# Patient Record
Sex: FEMALE | Race: WHITE
[De-identification: names, ages, dates, MRNs, and addresses within clinical notes are randomized per-mention and may not be internally consistent; named-entity substitution may affect disease eponyms.]

---

## 2017-04-22 ENCOUNTER — HOSPITAL ENCOUNTER (EMERGENCY)
Dept: HOSPITAL 47 - EC | Age: 42
Discharge: HOME | End: 2017-04-22
Payer: COMMERCIAL

## 2017-04-22 VITALS — DIASTOLIC BLOOD PRESSURE: 72 MMHG | SYSTOLIC BLOOD PRESSURE: 151 MMHG | HEART RATE: 86 BPM | TEMPERATURE: 97.7 F

## 2017-04-22 VITALS — RESPIRATION RATE: 18 BRPM

## 2017-04-22 DIAGNOSIS — F17.200: ICD-10-CM

## 2017-04-22 DIAGNOSIS — Z98.890: ICD-10-CM

## 2017-04-22 DIAGNOSIS — F32.9: ICD-10-CM

## 2017-04-22 DIAGNOSIS — M25.572: ICD-10-CM

## 2017-04-22 DIAGNOSIS — G89.29: ICD-10-CM

## 2017-04-22 DIAGNOSIS — Z79.899: ICD-10-CM

## 2017-04-22 DIAGNOSIS — F41.9: Primary | ICD-10-CM

## 2017-04-22 DIAGNOSIS — F19.19: ICD-10-CM

## 2017-04-22 LAB
APAP SPEC-MCNC: <10 UG/ML
SALICYLATES SERPL-MCNC: <1 MG/DL

## 2017-04-22 PROCEDURE — 80306 DRUG TEST PRSMV INSTRMNT: CPT

## 2017-04-22 PROCEDURE — 36415 COLL VENOUS BLD VENIPUNCTURE: CPT

## 2017-04-22 PROCEDURE — 99284 EMERGENCY DEPT VISIT MOD MDM: CPT

## 2017-04-22 PROCEDURE — 83520 IMMUNOASSAY QUANT NOS NONAB: CPT
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
## 2017-05-01 ENCOUNTER — HOSPITAL ENCOUNTER (EMERGENCY)
Dept: HOSPITAL 47 - EC | Age: 42
Discharge: HOME | End: 2017-05-01
Payer: COMMERCIAL

## 2017-05-01 VITALS
TEMPERATURE: 98 F | RESPIRATION RATE: 20 BRPM | DIASTOLIC BLOOD PRESSURE: 90 MMHG | SYSTOLIC BLOOD PRESSURE: 140 MMHG | HEART RATE: 120 BPM

## 2017-05-01 DIAGNOSIS — S02.5XXA: Primary | ICD-10-CM

## 2017-05-01 DIAGNOSIS — F31.9: ICD-10-CM

## 2017-05-01 DIAGNOSIS — K04.7: ICD-10-CM

## 2017-05-01 DIAGNOSIS — Z79.899: ICD-10-CM
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
## 2017-05-01 DIAGNOSIS — F17.200: ICD-10-CM

## 2017-05-01 PROCEDURE — 99282 EMERGENCY DEPT VISIT SF MDM: CPT
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
## 2017-05-20 ENCOUNTER — HOSPITAL ENCOUNTER (EMERGENCY)
Dept: HOSPITAL 47 - EC | Age: 42
Discharge: HOME | End: 2017-05-20
Payer: COMMERCIAL

## 2017-05-20 VITALS — HEART RATE: 86 BPM | SYSTOLIC BLOOD PRESSURE: 113 MMHG | DIASTOLIC BLOOD PRESSURE: 63 MMHG | RESPIRATION RATE: 16 BRPM

## 2017-05-20 VITALS — TEMPERATURE: 98.2 F

## 2017-05-20 DIAGNOSIS — F43.10: ICD-10-CM

## 2017-05-20 DIAGNOSIS — F31.9: Primary | ICD-10-CM

## 2017-05-20 DIAGNOSIS — F17.200: ICD-10-CM

## 2017-05-20 PROCEDURE — 80306 DRUG TEST PRSMV INSTRMNT: CPT

## 2017-05-20 PROCEDURE — 82075 ASSAY OF BREATH ETHANOL: CPT

## 2017-05-20 PROCEDURE — 99284 EMERGENCY DEPT VISIT MOD MDM: CPT
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
## 2020-01-10 ENCOUNTER — HOSPITAL ENCOUNTER (EMERGENCY)
Dept: HOSPITAL 47 - EC | Age: 45
End: 2020-01-10
Payer: COMMERCIAL

## 2020-01-10 DIAGNOSIS — Z53.21: Primary | ICD-10-CM

## 2020-01-10 PROCEDURE — 99499 UNLISTED E&M SERVICE: CPT

## 2021-01-22 ENCOUNTER — HOSPITAL ENCOUNTER (EMERGENCY)
Dept: HOSPITAL 47 - EC | Age: 46
Discharge: OTHER INSTITUTION | End: 2021-01-22
Payer: COMMERCIAL

## 2021-01-22 VITALS
RESPIRATION RATE: 18 BRPM | TEMPERATURE: 98.6 F | SYSTOLIC BLOOD PRESSURE: 145 MMHG | HEART RATE: 121 BPM | DIASTOLIC BLOOD PRESSURE: 112 MMHG

## 2021-01-22 DIAGNOSIS — F17.200: ICD-10-CM

## 2021-01-22 DIAGNOSIS — R45.851: ICD-10-CM

## 2021-01-22 DIAGNOSIS — F32.9: Primary | ICD-10-CM

## 2021-01-22 PROCEDURE — 99285 EMERGENCY DEPT VISIT HI MDM: CPT

## 2021-01-22 PROCEDURE — 82075 ASSAY OF BREATH ETHANOL: CPT
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
## 2021-07-27 ENCOUNTER — HOSPITAL ENCOUNTER (EMERGENCY)
Dept: HOSPITAL 47 - EC | Age: 46
LOS: 1 days | Discharge: HOME | End: 2021-07-28
Payer: COMMERCIAL

## 2021-07-27 VITALS
TEMPERATURE: 98.3 F | HEART RATE: 129 BPM | SYSTOLIC BLOOD PRESSURE: 140 MMHG | DIASTOLIC BLOOD PRESSURE: 88 MMHG | RESPIRATION RATE: 22 BRPM
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
## 2021-07-27 DIAGNOSIS — F12.90: ICD-10-CM

## 2021-07-27 DIAGNOSIS — F17.200: ICD-10-CM

## 2021-07-27 DIAGNOSIS — F10.129: Primary | ICD-10-CM

## 2021-07-27 LAB
ALBUMIN SERPL-MCNC: 4.9 G/DL (ref 3.5–5)
ALP SERPL-CCNC: 77 U/L (ref 38–126)
ALT SERPL-CCNC: 11 U/L (ref 4–34)
ANION GAP SERPL CALC-SCNC: 15 MMOL/L
APAP SPEC-MCNC: <10 UG/ML
AST SERPL-CCNC: 19 U/L (ref 14–36)
BASOPHILS # BLD AUTO: 0.1 K/UL (ref 0–0.2)
BASOPHILS NFR BLD AUTO: 0 %
BUN SERPL-SCNC: 10 MG/DL (ref 7–17)
CALCIUM SPEC-MCNC: 9.9 MG/DL (ref 8.4–10.2)
CHLORIDE SERPL-SCNC: 109 MMOL/L (ref 98–107)
CO2 SERPL-SCNC: 23 MMOL/L (ref 22–30)
EOSINOPHIL # BLD AUTO: 0.1 K/UL (ref 0–0.7)
EOSINOPHIL NFR BLD AUTO: 1 %
ERYTHROCYTE [DISTWIDTH] IN BLOOD BY AUTOMATED COUNT: 5.01 M/UL (ref 3.8–5.4)
ERYTHROCYTE [DISTWIDTH] IN BLOOD: 14.5 % (ref 11.5–15.5)
GLUCOSE SERPL-MCNC: 120 MG/DL (ref 74–99)
HCT VFR BLD AUTO: 44.3 % (ref 34–46)
HGB BLD-MCNC: 14.4 GM/DL (ref 11.4–16)
LYMPHOCYTES # SPEC AUTO: 3.7 K/UL (ref 1–4.8)
LYMPHOCYTES NFR SPEC AUTO: 34 %
MCH RBC QN AUTO: 28.8 PG (ref 25–35)
MCHC RBC AUTO-ENTMCNC: 32.5 G/DL (ref 31–37)
MCV RBC AUTO: 88.5 FL (ref 80–100)
MONOCYTES # BLD AUTO: 0.5 K/UL (ref 0–1)
MONOCYTES NFR BLD AUTO: 5 %
NEUTROPHILS # BLD AUTO: 6.1 K/UL (ref 1.3–7.7)
NEUTROPHILS NFR BLD AUTO: 57 %
PH UR: 5.5 [PH] (ref 5–8)
PLATELET # BLD AUTO: 225 K/UL (ref 150–450)
POTASSIUM SERPL-SCNC: 3.5 MMOL/L (ref 3.5–5.1)
PROT SERPL-MCNC: 7.9 G/DL (ref 6.3–8.2)
RBC UR QL: 1 /HPF (ref 0–5)
SALICYLATES SERPL-MCNC: <1 MG/DL
SODIUM SERPL-SCNC: 147 MMOL/L (ref 137–145)
SP GR UR: 1.01 (ref 1–1.03)
SQUAMOUS UR QL AUTO: <1 /HPF (ref 0–4)
UROBILINOGEN UR QL STRIP: <2 MG/DL (ref ?–2)
WBC # BLD AUTO: 10.8 K/UL (ref 3.8–10.6)
WBC #/AREA URNS HPF: 1 /HPF (ref 0–5)

## 2021-07-27 PROCEDURE — 80053 COMPREHEN METABOLIC PANEL: CPT

## 2021-07-27 PROCEDURE — 80143 DRUG ASSAY ACETAMINOPHEN: CPT

## 2021-07-27 PROCEDURE — 80179 DRUG ASSAY SALICYLATE: CPT

## 2021-07-27 PROCEDURE — 85025 COMPLETE CBC W/AUTO DIFF WBC: CPT

## 2021-07-27 PROCEDURE — 80306 DRUG TEST PRSMV INSTRMNT: CPT

## 2021-07-27 PROCEDURE — 80320 DRUG SCREEN QUANTALCOHOLS: CPT

## 2021-07-27 PROCEDURE — 36415 COLL VENOUS BLD VENIPUNCTURE: CPT

## 2021-07-27 PROCEDURE — 99284 EMERGENCY DEPT VISIT MOD MDM: CPT

## 2021-07-27 PROCEDURE — 81001 URINALYSIS AUTO W/SCOPE: CPT
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
## 2022-06-03 ENCOUNTER — HOSPITAL ENCOUNTER (EMERGENCY)
Dept: HOSPITAL 47 - EC | Age: 47
Discharge: HOME | End: 2022-06-03
Payer: COMMERCIAL

## 2022-06-03 VITALS — SYSTOLIC BLOOD PRESSURE: 138 MMHG | HEART RATE: 76 BPM | RESPIRATION RATE: 16 BRPM | DIASTOLIC BLOOD PRESSURE: 81 MMHG

## 2022-06-03 VITALS — TEMPERATURE: 98.2 F

## 2022-06-03 DIAGNOSIS — F15.10: Primary | ICD-10-CM

## 2022-06-03 DIAGNOSIS — F17.200: ICD-10-CM

## 2022-06-03 PROCEDURE — 80320 DRUG SCREEN QUANTALCOHOLS: CPT

## 2022-06-03 PROCEDURE — 99284 EMERGENCY DEPT VISIT MOD MDM: CPT

## 2022-06-03 PROCEDURE — 36415 COLL VENOUS BLD VENIPUNCTURE: CPT

## 2022-06-03 NOTE — ED
General Adult HPI





- General


Stated complaint: Altered Mental Status


Time Seen by Provider: 06/03/22 13:17


Source: police, RN notes reviewed, old records reviewed


Limitations: altered mental status





- History of Present Illness


Initial comments: 





This is a 46-year-old female presents emergency Department in the custody of the

police.  Patient was found wandering in the street not making any sense just bab

rohan and very paranoid.   states that they have dull with her 

before and she is a methamphetamine user.  Patient is unable to give any history

at this time because she is so delusional and fearful of us she is not 

responding to any questions and does not look as she understands the questions. 

There is no history of any trauma that we know of.  No one came with the patient

to give us any further history.





- Related Data


                                Home Medications











 Medication  Instructions  Recorded  Confirmed


 


No Known Home Medications  06/03/22 06/03/22











                                    Allergies











Allergy/AdvReac Type Severity Reaction Status Date / Time


 


No Known Allergies Allergy   Verified 06/03/22 14:26














Review of Systems


ROS Statement: 


Those systems with pertinent positive or pertinent negative responses have been 

documented in the HPI.





ROS Other: All systems not noted in ROS Statement are negative.





Past Medical History


Past Medical History: No Reported History


History of Any Multi-Drug Resistant Organisms: None Reported


Past Surgical History: Tubal Ligation


Additional Past Surgical History / Comment(s): LEFT LEG R/T TRAUMA, BLADDER 

SURGERY


Past Psychological History: Bipolar, Depression, PTSD


Smoking Status: Current every day smoker


Past Alcohol Use History: Daily


Past Drug Use History: Marijuana





General Exam





- General Exam Comments


Initial Comments: 





GENERAL:


Patient is well-developed and well-nourished.  Patient is nontoxic and well-

hydrated and very agitated..





ENT:


Neck is soft and supple.  No significant lymphadenopathy is noted.  Oropharynx 

is clear.  Moist mucous membranes.  Neck has full range of motion without 

eliciting any pain.  





EYES:


The sclera were anicteric and conjunctiva were pink and moist.  Extraocular 

movements were intact and pupils were equal round and reactive to light.  

Eyelids were unremarkable.





PULMONARY:


Unlabored respirations.  Good breath sounds bilaterally.  No audible rales 

rhonchi or wheezing was noted.





CARDIOVASCULAR:


There is a regular rate and rhythm without any murmurs gallops or rubs.





ABDOMEN:


Soft and nontender with normal bowel sounds.





SKIN:


Patient has superficial scratches on her abdomen and shoulders bilaterally.





NEUROLOGIC:


Patient is alert but is not oriented at all.  Patient is extremely..  Patient 

cranial nerves II through XII are grossly intact motor and 4 extremities is 

grossly intact as well. 





MUSCULOSKELETAL:


Normal extremities with adequate strength and full range of motion.  





LYMPHATICS:


No significant lymphadenopathy is noted





PSYCHIATRIC:


Patient is delusional very paranoid and is yelling at us not to kill her.





Course


                                   Vital Signs











  06/03/22





  13:32


 


Temperature 98.2 F


 


Pulse Rate 108 H


 


Respiratory 18





Rate 


 


Blood Pressure 132/68


 


O2 Sat by Pulse 98





Oximetry 














Medical Decision Making





- Medical Decision Making





EPS evaluated the patient and determined the patient could go home safely.





I went back in and reevaluated the patient she was alert and oriented 4 and had

no complaints and didn't want to be discharged home and stated she would be 

safe.  Patient states she wasn't speaking any earlier because she was having a 

bad trip on methamphetamine.





- Lab Data


                                   Lab Results











  06/03/22 Range/Units





  14:28 


 


Serum Alcohol  <10  mg/dL














Disposition


Clinical Impression: 


 Methamphetamine abuse





Disposition: HOME SELF-CARE


Condition: Good


Instructions (If sedation given, give patient instructions):  Methamphetamine 

Abuse (ED)


Is patient prescribed a controlled substance at d/c from ED?: No


Referrals: 


None,Stated [Primary Care Provider] - 1-2 days


Time of Disposition: 15:15

## 2023-04-06 ENCOUNTER — HOSPITAL ENCOUNTER (INPATIENT)
Dept: HOSPITAL 47 - EC | Age: 48
LOS: 5 days | Discharge: HOME | DRG: 773 | End: 2023-04-11
Attending: PSYCHIATRY & NEUROLOGY | Admitting: PSYCHIATRY & NEUROLOGY
Payer: MEDICAID

## 2023-04-06 VITALS — BODY MASS INDEX: 20.2 KG/M2

## 2023-04-06 DIAGNOSIS — F10.21: ICD-10-CM

## 2023-04-06 DIAGNOSIS — F31.30: ICD-10-CM

## 2023-04-06 DIAGNOSIS — R45.1: ICD-10-CM

## 2023-04-06 DIAGNOSIS — G47.00: ICD-10-CM

## 2023-04-06 DIAGNOSIS — Z28.21: ICD-10-CM

## 2023-04-06 DIAGNOSIS — Z59.02: ICD-10-CM

## 2023-04-06 DIAGNOSIS — Z71.3: ICD-10-CM

## 2023-04-06 DIAGNOSIS — F43.10: ICD-10-CM

## 2023-04-06 DIAGNOSIS — Z20.822: ICD-10-CM

## 2023-04-06 DIAGNOSIS — F12.20: ICD-10-CM

## 2023-04-06 DIAGNOSIS — F11.20: ICD-10-CM

## 2023-04-06 DIAGNOSIS — Z71.51: ICD-10-CM

## 2023-04-06 DIAGNOSIS — Z65.3: ICD-10-CM

## 2023-04-06 DIAGNOSIS — Z28.310: ICD-10-CM

## 2023-04-06 DIAGNOSIS — T50.901A: ICD-10-CM

## 2023-04-06 DIAGNOSIS — F15.259: Primary | ICD-10-CM

## 2023-04-06 DIAGNOSIS — F17.210: ICD-10-CM

## 2023-04-06 LAB
ALBUMIN SERPL-MCNC: 4.5 G/DL (ref 3.5–5)
ALP SERPL-CCNC: 90 U/L (ref 38–126)
ALT SERPL-CCNC: 33 U/L (ref 4–34)
ANION GAP SERPL CALC-SCNC: 9 MMOL/L
AST SERPL-CCNC: 47 U/L (ref 14–36)
BASOPHILS # BLD AUTO: 0 K/UL (ref 0–0.2)
BASOPHILS NFR BLD AUTO: 0 %
BUN SERPL-SCNC: 14 MG/DL (ref 7–17)
CALCIUM SPEC-MCNC: 9 MG/DL (ref 8.4–10.2)
CHLORIDE SERPL-SCNC: 98 MMOL/L (ref 98–107)
CO2 SERPL-SCNC: 27 MMOL/L (ref 22–30)
EOSINOPHIL # BLD AUTO: 0 K/UL (ref 0–0.7)
EOSINOPHIL NFR BLD AUTO: 0 %
ERYTHROCYTE [DISTWIDTH] IN BLOOD BY AUTOMATED COUNT: 4.28 M/UL (ref 3.8–5.4)
ERYTHROCYTE [DISTWIDTH] IN BLOOD: 13.5 % (ref 11.5–15.5)
GLUCOSE SERPL-MCNC: 108 MG/DL (ref 74–99)
HCT VFR BLD AUTO: 39.2 % (ref 34–46)
HGB BLD-MCNC: 13.1 GM/DL (ref 11.4–16)
LYMPHOCYTES # SPEC AUTO: 1.9 K/UL (ref 1–4.8)
LYMPHOCYTES NFR SPEC AUTO: 14 %
MCH RBC QN AUTO: 30.5 PG (ref 25–35)
MCHC RBC AUTO-ENTMCNC: 33.3 G/DL (ref 31–37)
MCV RBC AUTO: 91.5 FL (ref 80–100)
MONOCYTES # BLD AUTO: 0.9 K/UL (ref 0–1)
MONOCYTES NFR BLD AUTO: 7 %
NEUTROPHILS # BLD AUTO: 11.1 K/UL (ref 1.3–7.7)
NEUTROPHILS NFR BLD AUTO: 78 %
PLATELET # BLD AUTO: 253 K/UL (ref 150–450)
POTASSIUM SERPL-SCNC: 4 MMOL/L (ref 3.5–5.1)
PROT SERPL-MCNC: 7.6 G/DL (ref 6.3–8.2)
SODIUM SERPL-SCNC: 134 MMOL/L (ref 137–145)
T4 FREE SERPL-MCNC: 2.26 NG/DL (ref 0.78–2.19)
WBC # BLD AUTO: 14.2 K/UL (ref 3.8–10.6)

## 2023-04-06 PROCEDURE — 84436 ASSAY OF TOTAL THYROXINE: CPT

## 2023-04-06 PROCEDURE — 87635 SARS-COV-2 COVID-19 AMP PRB: CPT

## 2023-04-06 PROCEDURE — 36415 COLL VENOUS BLD VENIPUNCTURE: CPT

## 2023-04-06 PROCEDURE — 84439 ASSAY OF FREE THYROXINE: CPT

## 2023-04-06 PROCEDURE — 84443 ASSAY THYROID STIM HORMONE: CPT

## 2023-04-06 PROCEDURE — 84480 ASSAY TRIIODOTHYRONINE (T3): CPT

## 2023-04-06 PROCEDURE — 85025 COMPLETE CBC W/AUTO DIFF WBC: CPT

## 2023-04-06 PROCEDURE — 99285 EMERGENCY DEPT VISIT HI MDM: CPT

## 2023-04-06 PROCEDURE — 82248 BILIRUBIN DIRECT: CPT

## 2023-04-06 PROCEDURE — 80053 COMPREHEN METABOLIC PANEL: CPT

## 2023-04-06 PROCEDURE — 80306 DRUG TEST PRSMV INSTRMNT: CPT

## 2023-04-06 PROCEDURE — 80061 LIPID PANEL: CPT

## 2023-04-06 PROCEDURE — 83036 HEMOGLOBIN GLYCOSYLATED A1C: CPT

## 2023-04-06 PROCEDURE — 80320 DRUG SCREEN QUANTALCOHOLS: CPT

## 2023-04-06 SDOH — ECONOMIC STABILITY - HOUSING INSECURITY: UNSHELTERED HOMELESSNESS: Z59.02

## 2023-04-06 NOTE — ED
Psych HPI





- General


Chief Complaint: Overdose


Stated Complaint: Overdose


Time Seen by Provider: 04/06/23 17:16


Source: patient, police


Mode of arrival: EMS


Limitations: altered mental status





- History of Present Illness


Initial Comments: 





This patient is a 47-year-old woman brought by local law enforcement to have 

evaluation for suspected amphetamine intoxication.  The patient's apparently had

been observed lying by the road and that may have made some suicidal statements.

 When I interview the patient, she is discussing paranoid delusional thought c

ontent.  Patient denies suicidal ideation.


MD Complaint: other


-: unknown


Associated Psychiatric Symptoms: racing thoughts, delusions


Quality: constant


Context: recent drug abuse





- Related Data


                                Home Medications











 Medication  Instructions  Recorded  Confirmed


 


No Known Home Medications  06/03/22 04/06/23











                                    Allergies











Allergy/AdvReac Type Severity Reaction Status Date / Time


 


No Known Allergies Allergy   Verified 04/06/23 18:55














Review of Systems


ROS Statement: 


Those systems with pertinent positive or pertinent negative responses have been 

documented in the HPI.





ROS Other: All systems not noted in ROS Statement are negative.


Limitations: ROS unobtainable due to patients medical condition


Constitutional: Denies: fever


Respiratory: Denies: cough, dyspnea


Cardiovascular: Denies: chest pain


Gastrointestinal: Denies: abdominal pain, vomiting


Musculoskeletal: Denies: back pain


Skin: Denies: rash


Psychiatric: Reports: as per HPI, other.  Denies: homicidal thoughts, suicidal 

thoughts





Past Medical History


Past Medical History: No Reported History


History of Any Multi-Drug Resistant Organisms: None Reported


Past Surgical History: Tubal Ligation


Additional Past Surgical History / Comment(s): LEFT LEG R/T TRAUMA, BLADDER SURG

ALEX


Past Psychological History: Bipolar, Depression, PTSD


Smoking Status: Current every day smoker


Past Alcohol Use History: Daily


Past Drug Use History: Marijuana





General Exam


General appearance: alert, anxious


Head exam: Present: atraumatic, normocephalic


Eye exam: Present: normal appearance.  Absent: scleral icterus, conjunctival 

injection


ENT exam: Present: mucous membranes dry


Neck exam: Present: normal inspection, full ROM.  Absent: meningismus


Respiratory exam: Present: normal lung sounds bilaterally.  Absent: respiratory 

distress, wheezes, rales, rhonchi, stridor


Cardiovascular Exam: Present: normal rhythm, tachycardia, normal heart sounds.  

Absent: systolic murmur, diastolic murmur, rubs, gallop


GI/Abdominal exam: Present: soft.  Absent: distended, tenderness, guarding, 

rebound, rigid, mass


Extremities exam: Present: normal inspection, normal capillary refill.  Absent: 

pedal edema, calf tenderness


Back exam: Present: normal inspection.  Absent: CVA tenderness (R), CVA 

tenderness (L)


Neurological exam: Present: alert


Psychiatric exam: Present: agitated, manic.  Absent: homicidal ideation, 

suicidal ideation


Skin exam: Present: warm, dry, intact, normal color.  Absent: rash





Course


                                   Vital Signs











  04/06/23





  17:01


 


Temperature 98.4 F


 


Pulse Rate 108 H


 


Respiratory 18





Rate 


 


Blood Pressure 136/82


 


O2 Sat by Pulse 100





Oximetry 














Medical Decision Making





- Medical Decision Making











Patient is 47-year-old woman who does appear to be displaying delusional thought

content and disorganized thought processes.  Patient seen by EPS and will be 

admitted here.








Was pt. sent in by a medical professional or institution (, PA, NP, urgent 

care, hospital, or nursing home...) When possible be specific


@  -Patient is brought to have evaluation by local law enforcement


Did you speak to anyone other than the patient for history (EMS, parent, family,

police, friend...)? What history was obtained from this source 


@  -[No]


Did you review nursing and triage notes (agree or disagree)?  Why? 


@  -[I reviewed and agree with nursing and triage notes]


Were old charts reviewed (outside hosp., previous admission, EMS record, old 

EKG, old radiological studies, urgent care reports/EKG's, nursing home records)?

Report findings 


@  -[No old charts were reviewed]


Differential Diagnosis (chest pain, altered mental status, abdominal pain women,

abdominal pain men, vaginal bleeding, weakness, fever, dyspnea, syncope, 

headache, dizziness, GI bleed, back pain, seizure, CVA, palpatations, mental 

health, musculoskeletal)? 


@  -[Differential Mental Health


Depression, anxiety, bipolar, psychosis, schizophrenia, borderline personality, 

situational depression, adjustment disorder, behavioral disorder, brain tumor, 

malingering, substance abuse, encephalopathy, medication reaction, dementia, 

hypothyroidism, degenerative neurologic disorder, lupus.... This is not meant to

 be all-inclusive list


EKG interpreted by me (3pts min.).


@  -[


X-rays interpreted by me (1pt min.).


@  -[None done]


CT interpreted by me (1pt min.).


@  -[None done]


U/S interpreted by me (1pt. min.).


@  -[None done]


What testing was considered but not performed or refused? (CT, X-rays, U/S, 

labs)? Why?


@  -[None]


What meds were considered but not given or refused? Why?


@  -[None]


Did you discuss the management of the patient with other professionals 

(professionals i.e. , PA, NP, lab, RT, psych nurse, , , 

teacher, , )? Give summary


@  -[Discussed with EPS personnel


Was smoking cessation discussed for >3mins.?


@  -[No]


Was critical care preformed (if so, how long)?


@  -[No]


Were there social determinants of health that impacted care today? How? 

(Homelessness, low income, unemployed, alcoholism, drug addiction, 

transportation, low edu. Level, literacy, decrease access to med. care, [facility], 

rehab)?


@  -[No]


Was there de-escalation of care discussed even if they declined (Discuss DNR or 

withdrawal of care, Hospice)? DNR status


@  -[No]


What co-morbidities impacted this encounter? (DM, HTN, Smoking, COPD, CAD, 

Cancer, CVA, ARF, Chemo, Hep., AIDS, mental health diagnosis, sleep apnea, 

morbid obesity)?


@  -[None]


Was patient admitted / discharged? Hospital course, mention meds given and 

route, prescriptions, significant lab abnormalities, going to OR and other per

tinent info.


@  -[Admitted] 


Undiagnosed new problem with uncertain prognosis?


@  -[No]


Drug Therapy requiring intensive monitoring for toxicity (Heparin, Nitro, 

Insulin, Cardizem)?


@  -[No]


Were any procedures done?


@  -[No]


Diagnosis/symptom?


@  -[Acute psychosis, uncomplicated


2.  Polysubstance abuse, acute


Acute, or Chronic, or Acute on Chronic?


@  -[default]


Uncomplicated (without systemic symptoms) or Complicated (systemic symptoms)?


@  -[default]


Side effects of treatment?


@  -[No]


Exacerbation, Progression, or Severe Exacerbation?


@  -[No]


Poses a threat to life or bodily function? How? (Chest pain, USA, MI, pneumonia,

 PE, COPD, DKA, ARF, appy, cholecystitis, CVA, Diverticulitis, Homicidal, 

Suicidal, threat to staff... and all critical care pts)


@  -[No]





- Lab Data


Result diagrams: 


                                 04/07/23 12:17





                                 04/07/23 12:17


                                   Lab Results











  04/06/23 04/06/23 04/06/23 Range/Units





  18:00 18:00 22:40 


 


WBC  14.2 H    (3.8-10.6)  k/uL


 


RBC  4.28    (3.80-5.40)  m/uL


 


Hgb  13.1    (11.4-16.0)  gm/dL


 


Hct  39.2    (34.0-46.0)  %


 


MCV  91.5    (80.0-100.0)  fL


 


MCH  30.5    (25.0-35.0)  pg


 


MCHC  33.3    (31.0-37.0)  g/dL


 


RDW  13.5    (11.5-15.5)  %


 


Plt Count  253    (150-450)  k/uL


 


MPV  7.9    


 


Neutrophils %  78    %


 


Lymphocytes %  14    %


 


Monocytes %  7    %


 


Eosinophils %  0    %


 


Basophils %  0    %


 


Neutrophils #  11.1 H    (1.3-7.7)  k/uL


 


Lymphocytes #  1.9    (1.0-4.8)  k/uL


 


Monocytes #  0.9    (0-1.0)  k/uL


 


Eosinophils #  0.0    (0-0.7)  k/uL


 


Basophils #  0.0    (0-0.2)  k/uL


 


Sodium   134 L   (137-145)  mmol/L


 


Potassium   4.0   (3.5-5.1)  mmol/L


 


Chloride   98   ()  mmol/L


 


Carbon Dioxide   27   (22-30)  mmol/L


 


Anion Gap   9   mmol/L


 


BUN   14   (7-17)  mg/dL


 


Creatinine   0.67   (0.52-1.04)  mg/dL


 


Est GFR (CKD-EPI)AfAm   >90   (>60 ml/min/1.73 sqM)  


 


Est GFR (CKD-EPI)NonAf   >90   (>60 ml/min/1.73 sqM)  


 


Glucose   108 H   (74-99)  mg/dL


 


Calcium   9.0   (8.4-10.2)  mg/dL


 


Total Bilirubin   1.0   (0.2-1.3)  mg/dL


 


AST   47 H   (14-36)  U/L


 


ALT   33   (4-34)  U/L


 


Alkaline Phosphatase   90   ()  U/L


 


Total Protein   7.6   (6.3-8.2)  g/dL


 


Albumin   4.5   (3.5-5.0)  g/dL


 


TSH   <0.015 L   (0.465-4.680)  mIU/L


 


Free T4   2.26 H   (0.78-2.19)  ng/dL


 


Urine Opiates Screen    Not Detected  (NotDetected)  


 


Ur Oxycodone Screen    Not Detected  (NotDetected)  


 


Urine Methadone Screen    Not Detected  (NotDetected)  


 


Ur Propoxyphene Screen    Not Detected  (NotDetected)  


 


Ur Barbiturates Screen    Not Detected  (NotDetected)  


 


U Tricyclic Antidepress    Not Detected  (NotDetected)  


 


Ur Phencyclidine Scrn    Not Detected  (NotDetected)  


 


Ur Amphetamines Screen    Detected H  (NotDetected)  


 


U Methamphetamines Scrn    Detected H  (NotDetected)  


 


U Benzodiazepines Scrn    Not Detected  (NotDetected)  


 


Urine Cocaine Screen    Detected H  (NotDetected)  


 


U Marijuana (THC) Screen    Detected H  (NotDetected)  


 


Serum Alcohol   <10   mg/dL


 


Coronavirus (PCR)     (Not Detectd)  














  04/07/23 Range/Units





  02:04 


 


WBC   (3.8-10.6)  k/uL


 


RBC   (3.80-5.40)  m/uL


 


Hgb   (11.4-16.0)  gm/dL


 


Hct   (34.0-46.0)  %


 


MCV   (80.0-100.0)  fL


 


MCH   (25.0-35.0)  pg


 


MCHC   (31.0-37.0)  g/dL


 


RDW   (11.5-15.5)  %


 


Plt Count   (150-450)  k/uL


 


MPV   


 


Neutrophils %   %


 


Lymphocytes %   %


 


Monocytes %   %


 


Eosinophils %   %


 


Basophils %   %


 


Neutrophils #   (1.3-7.7)  k/uL


 


Lymphocytes #   (1.0-4.8)  k/uL


 


Monocytes #   (0-1.0)  k/uL


 


Eosinophils #   (0-0.7)  k/uL


 


Basophils #   (0-0.2)  k/uL


 


Sodium   (137-145)  mmol/L


 


Potassium   (3.5-5.1)  mmol/L


 


Chloride   ()  mmol/L


 


Carbon Dioxide   (22-30)  mmol/L


 


Anion Gap   mmol/L


 


BUN   (7-17)  mg/dL


 


Creatinine   (0.52-1.04)  mg/dL


 


Est GFR (CKD-EPI)AfAm   (>60 ml/min/1.73 sqM)  


 


Est GFR (CKD-EPI)NonAf   (>60 ml/min/1.73 sqM)  


 


Glucose   (74-99)  mg/dL


 


Calcium   (8.4-10.2)  mg/dL


 


Total Bilirubin   (0.2-1.3)  mg/dL


 


AST   (14-36)  U/L


 


ALT   (4-34)  U/L


 


Alkaline Phosphatase   ()  U/L


 


Total Protein   (6.3-8.2)  g/dL


 


Albumin   (3.5-5.0)  g/dL


 


TSH   (0.465-4.680)  mIU/L


 


Free T4   (0.78-2.19)  ng/dL


 


Urine Opiates Screen   (NotDetected)  


 


Ur Oxycodone Screen   (NotDetected)  


 


Urine Methadone Screen   (NotDetected)  


 


Ur Propoxyphene Screen   (NotDetected)  


 


Ur Barbiturates Screen   (NotDetected)  


 


U Tricyclic Antidepress   (NotDetected)  


 


Ur Phencyclidine Scrn   (NotDetected)  


 


Ur Amphetamines Screen   (NotDetected)  


 


U Methamphetamines Scrn   (NotDetected)  


 


U Benzodiazepines Scrn   (NotDetected)  


 


Urine Cocaine Screen   (NotDetected)  


 


U Marijuana (THC) Screen   (NotDetected)  


 


Serum Alcohol   mg/dL


 


Coronavirus (PCR)  Not Detected  (Not Detectd)  














Disposition


Clinical Impression: 


 Polysubstance abuse, Psychosis





Disposition: ADMITTED AS IP TO THIS HOSP


Condition: Fair


Is patient prescribed a controlled substance at d/c from ED?: No

## 2023-04-07 LAB
ALBUMIN SERPL-MCNC: 3.8 G/DL (ref 3.5–5)
ALP SERPL-CCNC: 87 U/L (ref 38–126)
ALT SERPL-CCNC: 29 U/L (ref 4–34)
ANION GAP SERPL CALC-SCNC: 2 MMOL/L
AST SERPL-CCNC: 31 U/L (ref 14–36)
BASOPHILS # BLD AUTO: 0 K/UL (ref 0–0.2)
BASOPHILS NFR BLD AUTO: 0 %
BILIRUB INDIRECT SERPL-MCNC: 0.3 MG/DL (ref 0–1.1)
BILIRUBIN DIRECT+TOT PNL SERPL-MCNC: 0.1 MG/DL (ref 0–0.2)
BUN SERPL-SCNC: 12 MG/DL (ref 7–17)
CALCIUM SPEC-MCNC: 9 MG/DL (ref 8.4–10.2)
CHLORIDE SERPL-SCNC: 100 MMOL/L (ref 98–107)
CHOLEST SERPL-MCNC: 166 MG/DL (ref 0–200)
CO2 SERPL-SCNC: 35 MMOL/L (ref 22–30)
EOSINOPHIL # BLD AUTO: 0.1 K/UL (ref 0–0.7)
EOSINOPHIL NFR BLD AUTO: 1 %
ERYTHROCYTE [DISTWIDTH] IN BLOOD BY AUTOMATED COUNT: 4.28 M/UL (ref 3.8–5.4)
ERYTHROCYTE [DISTWIDTH] IN BLOOD: 13.7 % (ref 11.5–15.5)
GLUCOSE SERPL-MCNC: 108 MG/DL (ref 74–99)
HCT VFR BLD AUTO: 40.1 % (ref 34–46)
HDLC SERPL-MCNC: 88 MG/DL (ref 40–60)
HGB BLD-MCNC: 13.1 GM/DL (ref 11.4–16)
LDLC SERPL CALC-MCNC: 58.1 MG/DL (ref 0–131)
LYMPHOCYTES # SPEC AUTO: 1.6 K/UL (ref 1–4.8)
LYMPHOCYTES NFR SPEC AUTO: 17 %
MCH RBC QN AUTO: 30.5 PG (ref 25–35)
MCHC RBC AUTO-ENTMCNC: 32.5 G/DL (ref 31–37)
MCV RBC AUTO: 93.7 FL (ref 80–100)
MONOCYTES # BLD AUTO: 0.5 K/UL (ref 0–1)
MONOCYTES NFR BLD AUTO: 6 %
NEUTROPHILS # BLD AUTO: 6.9 K/UL (ref 1.3–7.7)
NEUTROPHILS NFR BLD AUTO: 74 %
PLATELET # BLD AUTO: 242 K/UL (ref 150–450)
POTASSIUM SERPL-SCNC: 4.5 MMOL/L (ref 3.5–5.1)
PROT SERPL-MCNC: 6.6 G/DL (ref 6.3–8.2)
SODIUM SERPL-SCNC: 137 MMOL/L (ref 137–145)
TRIGL SERPL-MCNC: 99.3 MG/DL (ref 0–149)
VLDLC SERPL CALC-MCNC: 19.86 MG/DL (ref 5–40)
WBC # BLD AUTO: 9.3 K/UL (ref 3.8–10.6)

## 2023-04-07 RX ADMIN — NICOTINE SCH PATCH: 14 PATCH, EXTENDED RELEASE TRANSDERMAL at 08:45

## 2023-04-07 NOTE — P.HP
Psychiatric H&P





- .


H&P Date: 23


History & Physical: 


                                    Allergies











Allergy/AdvReac Type Severity Reaction Status Date / Time


 


No Known Allergies Allergy   Verified 23 18:55








                                   Vital Signs











Temp  98.3 F   23 06:11


 


Pulse  52 L  23 06:11


 


Resp  15   23 06:11


 


BP  115/61   23 06:11


 


Pulse Ox  96   23 05:26


 


FiO2      








                                 Intake & Output











 23





 18:59 06:59 18:59


 


Weight 58.967 kg 49.3 kg 








                             Laboratory Last Values











WBC  14.2 k/uL (3.8-10.6)  H  23  18:00    


 


RBC  4.28 m/uL (3.80-5.40)   23  18:00    


 


Hgb  13.1 gm/dL (11.4-16.0)   23  18:00    


 


Hct  39.2 % (34.0-46.0)   23  18:00    


 


MCV  91.5 fL (80.0-100.0)   23  18:00    


 


MCH  30.5 pg (25.0-35.0)   23  18:00    


 


MCHC  33.3 g/dL (31.0-37.0)   23  18:00    


 


RDW  13.5 % (11.5-15.5)   23  18:00    


 


Plt Count  253 k/uL (150-450)   23  18:00    


 


MPV  7.9   23  18:00    


 


Neutrophils %  78 %  23  18:00    


 


Lymphocytes %  14 %  23  18:00    


 


Monocytes %  7 %  23  18:00    


 


Eosinophils %  0 %  23  18:00    


 


Basophils %  0 %  23  18:00    


 


Neutrophils #  11.1 k/uL (1.3-7.7)  H  23  18:00    


 


Lymphocytes #  1.9 k/uL (1.0-4.8)   23  18:00    


 


Monocytes #  0.9 k/uL (0-1.0)   23  18:00    


 


Eosinophils #  0.0 k/uL (0-0.7)   23  18:00    


 


Basophils #  0.0 k/uL (0-0.2)   23  18:00    


 


Sodium  134 mmol/L (137-145)  L  23  18:00    


 


Potassium  4.0 mmol/L (3.5-5.1)   23  18:00    


 


Chloride  98 mmol/L ()   23  18:00    


 


Carbon Dioxide  27 mmol/L (22-30)   23  18:00    


 


Anion Gap  9 mmol/L  23  18:00    


 


BUN  14 mg/dL (7-17)   23  18:00    


 


Creatinine  0.67 mg/dL (0.52-1.04)   23  18:00    


 


Est GFR (CKD-EPI)AfAm  >90  (>60 ml/min/1.73 sqM)   23  18:00    


 


Est GFR (CKD-EPI)NonAf  >90  (>60 ml/min/1.73 sqM)   23  18:00    


 


Glucose  108 mg/dL (74-99)  H  23  18:00    


 


Calcium  9.0 mg/dL (8.4-10.2)   23  18:00    


 


Total Bilirubin  1.0 mg/dL (0.2-1.3)   23  18:00    


 


AST  47 U/L (14-36)  H  23  18:00    


 


ALT  33 U/L (4-34)   23  18:00    


 


Alkaline Phosphatase  90 U/L ()   23  18:00    


 


Total Protein  7.6 g/dL (6.3-8.2)   23  18:00    


 


Albumin  4.5 g/dL (3.5-5.0)   23  18:00    


 


TSH  <0.015 mIU/L (0.465-4.680)  L  23  18:00    


 


Free T4  2.26 ng/dL (0.78-2.19)  H  23  18:00    


 


Urine Opiates Screen  Not Detected  (NotDetected)   23  22:40    


 


Ur Oxycodone Screen  Not Detected  (NotDetected)   23  22:40    


 


Urine Methadone Screen  Not Detected  (NotDetected)   23  22:40    


 


Ur Propoxyphene Screen  Not Detected  (NotDetected)   23  22:40    


 


Ur Barbiturates Screen  Not Detected  (NotDetected)   23  22:40    


 


U Tricyclic Antidepress  Not Detected  (NotDetected)   23  22:40    


 


Ur Phencyclidine Scrn  Not Detected  (NotDetected)   23  22:40    


 


Ur Amphetamines Screen  Detected  (NotDetected)  H  23  22:40    


 


U Methamphetamines Scrn  Detected  (NotDetected)  H  23  22:40    


 


U Benzodiazepines Scrn  Not Detected  (NotDetected)   23  22:40    


 


Urine Cocaine Screen  Detected  (NotDetected)  H  23  22:40    


 


U Marijuana (THC) Screen  Detected  (NotDetected)  H  23  22:40    


 


Serum Alcohol  <10 mg/dL  23  18:00    


 


Coronavirus (PCR)  Not Detected  (Not Detectd)   23  02:04    











23 09:26


Identification: Rafiq Stallings is a 47 years old single white female who 

is a homeless person living in [facility].  She was readmitted to 

[facility] on 2023 under a mental health addition 

stating that she was standing at somebody's porch with 2 syringes which she 

injected on herself and and was talking "crazy about killing herself", and it 

also says she was standing in the middle of the throat saying they were coming 

to get her.





History of present illness: Patient does not seem to be a good historian.  When 

she was asked for the reasons for coming to hospital she said she needed to get 

herself on mood stabilizers.  She said she has not been taking any medications 

for more than 30 days.  She was not able to tell me what medications she was on.

 When she was asked to describe her need for mood stabilizers she said she gets 

myers when it is hard for her to sleep and gets racing thoughts.  Finally she 

said she has been having this problem for 5-10 years now.  She denies any issues

with mood lability, denies any symptoms of depression or manic episodes.  She 

denies hallucinations and delusional thinking.  However she has been homeless 

and has been living on the streets.  She said she gets SSI and is on [place] 

Medicaid.





Previous psychiatric history/drug and alcohol abuse: She said she was in this 

hospital in the past and had one rehab outside this place.  She said she had cut

herself twice in the past when she was under the influence of meth.  She said 

she was drinking heavily in the past and her last drink was about 3 years ago.  

She said she has been abusing meth for about one year by IV and shoats about a 

gram a day.  She said she has been smoking pot for the last 5-6 years and smokes

about 3-4 joints a day.  She denied abusing other drugs however her UDS was 

positive for amphetamine and methamphetamine cocaine and cannabis.





Previous medical history: She denies any ongoing physical problems.  She is not 

ALLERGIC to any medication.  She still has her menstrual periods and her last 

one was 1 week ago.  She has 2 grownup children from 2 different men.  She did 

not have any .  She said she had some kind of a surgery on her right leg

and cannot describe the reasons for that.  She had tubal ligation.





Social history: she quit school in eighth grade and had trouble in learning.  

She said she had repeated  and second or third grade.  She said she 

did not have any discipline issues when she was growing up.  She said she was in

penitentiary 5-10 times for assault and battery DUI and other reasons she could not 

explain.  She was raised by her parents and denies any abuse.





Family history: She denies any history of general medical or mental health 

issues in the family.





Mental status examination: This is rather thin ambulatory white female who 

appears rather unkempt.  She is not a good historian.  She is somewhat 

hyperactive and restless.  Her speech is spontaneous and sometimes hard to 

understand because of mumbling.  But it is goal-directed.  Her mood is cheerful 

to mildly anxious and affect is appropriate to the thought content.  She denied 

hallucinations delusional thinking suicide and homicide thoughts.  She said this

is 2023.  She said the current President. is Biden before him was Trump 

and before him was Jeferson.  She is able to spell house both forwards and 

backwards correctly.





Strengths: Has income and health insurance.





Weakness: Substance abuse, homelessness.





Diagnostic impression: Stimulant use disorder severe, cannabis use disorder 

severe, alcohol use disorder severe in remission.





Treatment plan: She will have physical examination and psychosocial evaluation. 

She will receive milieu therapy group therapy individual therapy occupational 

therapy and recreational therapy.  We will use only when necessary medications 

since she does not appear to have any condition other than substance abuse.  

Consider rehab if she is willing.  Discharge with outpatient follow-up.

## 2023-04-08 RX ADMIN — BUPRENORPHINE HYDROCHLORIDE AND NALOXONE HYDROCHLORIDE DIHYDRATE SCH EACH: 8; 2 TABLET SUBLINGUAL at 11:59

## 2023-04-08 RX ADMIN — NICOTINE SCH PATCH: 14 PATCH, EXTENDED RELEASE TRANSDERMAL at 10:55

## 2023-04-08 NOTE — P.PN
Progress Note - Text


Progress Note Date: 04/07/23





Attempted to see the patient in the mental health unit at 2100 on 4/7.  The 

patient refused to be seen or be evaluated.

## 2023-04-08 NOTE — P.PN
Subjective


Progress Note Date: 04/08/23


Principal diagnosis: 





Bipolar 1 depressed


Polysubstance abuse including opioids





The patient was willing to get up and come and talk to me she says she does want

to take medications.


She says she has trouble sleeping and then is too tired all day has no appetite 

and no energy.


She says that she wants to get off of drugs and tends to gets help but then goes

right back to using.  She feels hopeless and worthless.


She says that the Suboxone was helpful in terms of dealing with withdrawal.


She says that in addition to substance use and separate from it she does have 

manic episodes and depressive episodes.  During the manic episodes she does not 

need to sleep is talking all the time has abundant energy says things without 

thinking.  She then plunges into depression and would like a stabilizer.





Objective the patient was sleeping in bed and had a lot of trouble getting up an

d coming to talk to me and was somewhat unstable on her feet.  However she was 

cooperative, decreased psychomotor activity, poor eye contact, low energy, she 

denied any psychotic symptoms.





Assessment the patient needs mood stability as well as help with the 

polysubstance abuse.





Plan: Were going to start her on Zyprexa at night for mood stability and sleep 

and appetite.  Also going to give her half a dose of Suboxone to help with the 

withdrawal.  She will need to get into a rehab program and try again.





Objective





- Vital Signs


Vital signs: 


                                   Vital Signs











Temp  97.8 F   04/08/23 06:37


 


Pulse  74   04/08/23 06:37


 


Resp  16   04/08/23 06:37


 


BP  113/71   04/08/23 06:37


 


Pulse Ox  98   04/08/23 06:37


 


FiO2      








                                 Intake & Output











 04/07/23 04/08/23 04/08/23





 18:59 06:59 18:59


 


Weight 49.3 kg  














- Labs


CBC & Chem 7: 


                                 04/07/23 12:17





                                 04/07/23 12:17


Labs: 


                  Abnormal Lab Results - Last 24 Hours (Table)











  04/07/23 Range/Units





  12:17 


 


Carbon Dioxide  35 H  (22-30)  mmol/L


 


Glucose  108 H  (74-99)  mg/dL


 


HDL Cholesterol  88.00 H  (40.00-60.00)  mg/dL


 


TSH  <0.015 L  (0.465-4.680)  mIU/L

## 2023-04-09 RX ADMIN — BUPRENORPHINE HYDROCHLORIDE AND NALOXONE HYDROCHLORIDE DIHYDRATE SCH EACH: 8; 2 TABLET SUBLINGUAL at 08:14

## 2023-04-09 RX ADMIN — NICOTINE SCH PATCH: 14 PATCH, EXTENDED RELEASE TRANSDERMAL at 08:18

## 2023-04-09 NOTE — P.PN
Subjective


Progress Note Date: 04/09/23


Principal diagnosis: 





Bipolar 1 depressed


Polysubstance abuse including opioids





ID: 47-year-old female


Subjective the patient says she was able to make it to some groups that she s

lept well and that her appetite is better.  In fact she was on the way to 

breakfast when I talked to her briefly.  She received low-dose Zyprexa but she 

is a small person and so is a reasonable dose and says she thinks it helped her 

sleep


Diagnosis: Bipolar disorder/polysubstance use disorder


She says that she wants to get off of drugs and tends to gets help but then goes

right back to using.  She feels hopeless and worthless.


She says that the Suboxone was helpful in terms of dealing with withdrawal.





She says that in addition to substance use and separate from it she does have 

manic episodes and depressive episodes.  During the manic episodes she does not 

need to sleep is talking all the time has abundant energy says things without 

thinking.  She then plunges into depression and would like a stabilizer.





Objective: the patient was was somewhat unstable on her feet.  However she was 

cooperative, decreased psychomotor activity, improved eye contact, low energy, 

she denied any psychotic symptoms, denies suicidality as a little bit of hope.





Assessment: She seems a little better today





Plan: We started her on Zyprexa at night for mood stability and sleep and 

appetite and she seems to have tolerated with benefit already.  Also gave her 

half a dose of Suboxone to help with the withdrawal.  She will need to get into 

a rehab program and try again.





Objective





- Vital Signs


Vital signs: 


                                   Vital Signs











Temp  97.6 F   04/09/23 06:48


 


Pulse  59 L  04/09/23 06:48


 


Resp  14   04/09/23 06:48


 


BP  110/58   04/09/23 06:48


 


Pulse Ox  98   04/08/23 06:37


 


FiO2      














- Labs


CBC & Chem 7: 


                                 04/07/23 12:17





                                 04/07/23 12:17

## 2023-04-09 NOTE — P.CONS
History of Present Illness





- Reason for Consult


Consult date: 04/09/23





- History of Present Illness





The patient is a 47-year-old female with a PMH of polysubstance abuse, currently

homeless who presented to the emergency room requesting for a psychiatric 

evaluation.  The patient was admitted to mental health unit where she was seen 

and evaluated with the mental health unit RN Marbin.  The patient states that she 

is currently homeless and has been walking a lot every day which are causing 

blisters on her feet.  She also reports ongoing use of heroin and 

methamphetamines.  She denied alcohol use but admits to smoking one pack of 

cigarettes daily.  Denies experiencing chest discomfort, fever, chills, cough, 

nausea, vomiting, abdominal pain, dysuria.





Review of systems:


Pertinent positives and negatives as discussed in HPI, a complete review of 

systems was performed and all other systems are negative.





Physical examination:


General: non toxic, no distress, appears at stated age, normal weight


Derm: no unusual rashes/lesions, no unusual ecchymoses, warm, dry


Head: atraumatic, normocephalic, symmetric


Eyes: EOMI, no lid lag, anicteric sclera


ENT: Nose and ears atraumatic, no thrush,  no pharyngeal erythema


Neck: trachea midline, supple


Mouth: no lip lesion, mucus membranes moist


Cardiovascular: S1S2 reg, no murmur, no edema


Lungs: CTA bilateral, no rhonchi, no rales , no accessory muscle use


Abdominal: soft,  nontender to palpation, no guarding


Ext: no gross muscle atrophy, no contractures, 


Neuro: No gross focal neuro deficits noted 


Psych: Alert, oriented, appropriate affect 





Assessment:


Low TSH


Polysubstance abuse





Imaging:


None performed





Data Review:


Laboratory evaluation remarkable for TSH less than 0.015, with urine toxicology 

positive for amphetamines, methamphetamines, cocaine, and marijuana.





Plan:


Check T3 and T4 levels


Strongly advised patient on importance of cessation from substance use





Thank you for allowing us to participate in the care of this patient.  We will 

follow peripherally.  Do not hesitate to contact us with questions.  Someone can

be reached from the [facility] hospitalist group at all hours of the day 

at [phone].





Past Medical History


Past Medical History: No Reported History


History of Any Multi-Drug Resistant Organisms: None Reported


Past Surgical History: Tubal Ligation


Additional Past Surgical History / Comment(s): LEFT LEG R/T TRAUMA, BLADDER 

SURGERY


Past Anesthesia/Blood Transfusion Reactions: No Reported Reaction


Past Psychological History: Bipolar, Depression, PTSD


Smoking Status: Current every day smoker


Past Alcohol Use History: Daily


Past Drug Use History: Marijuana





- Past Family History


  ** Mother


Family Medical History: COPD





Medications and Allergies


                                Home Medications











 Medication  Instructions  Recorded  Confirmed  Type


 


No Known Home Medications  06/03/22 04/06/23 History








                                    Allergies











Allergy/AdvReac Type Severity Reaction Status Date / Time


 


No Known Allergies Allergy   Verified 04/06/23 18:55














Physical Exam


Vitals: 


                                   Vital Signs











  Temp Pulse Resp BP Pulse Ox


 


 04/08/23 06:37  97.8 F  74  16  113/71  98














Results


CBC & Chem 7: 


                                 04/07/23 12:17





                                 04/07/23 12:17

## 2023-04-10 VITALS — RESPIRATION RATE: 16 BRPM

## 2023-04-10 RX ADMIN — NICOTINE SCH PATCH: 14 PATCH, EXTENDED RELEASE TRANSDERMAL at 08:26

## 2023-04-10 RX ADMIN — BUPRENORPHINE HYDROCHLORIDE AND NALOXONE HYDROCHLORIDE DIHYDRATE SCH EACH: 8; 2 TABLET SUBLINGUAL at 08:26

## 2023-04-10 NOTE — P.PN
Progress Note - Text


Progress Note Date: 04/10/23


S&O: Patient was seen in rounds.  She was seen by the weekend psychiatrist who 

put her on Suboxone since he felt he was abusing opioids too.  But she said she 

has not been opioids for long time now.  She was put on Zyprexa at night to help

her sleep better and to increase her appetite.  But she has been abusing meth by

IV which will cause insomnia and poor appetite.  She does not show any psychotic

or agitated behavior.  Today she said she can go to stay with her sister in her 

efficiency apartment or find a place of her own.  She has not been agitated or 

violent.





This is a right ambulatory female with adequate hygiene.  She is calm and 

cooperative and does not show any psychomotor agitation or retardation.  Her 

speech is spontaneous and goal-directed.  Mood is euthymic to cheerful and 

affect is appropriate.  Denies hallucinations delusional thinking suicide and 

homicide thoughts.  Sensorium is clear.





A&P: Discontinue Suboxone since she is not currently abusing opioids and is not 

under Suboxone replacement therapy and is a substance abuser and she can also 

abuse Suboxone.  Continue Zyprexa for the time being and she would not need it 

when she leaves the hospital.  She will work with the  about 

placement.  Continue therapies and supervision.

## 2023-04-11 VITALS — HEART RATE: 51 BPM | TEMPERATURE: 97.5 F | DIASTOLIC BLOOD PRESSURE: 53 MMHG | SYSTOLIC BLOOD PRESSURE: 122 MMHG

## 2023-04-11 RX ADMIN — NICOTINE SCH: 14 PATCH, EXTENDED RELEASE TRANSDERMAL at 07:51

## 2023-04-11 RX ADMIN — NICOTINE SCH PATCH: 14 PATCH, EXTENDED RELEASE TRANSDERMAL at 07:53

## 2023-04-11 NOTE — P.PN
Progress Note - Text


Progress Note Date: 04/11/23


S&O: Patient was seen in rounds.  She said she is feeling well and is ready to 

go home.  She said she will go to her sister's house collect her belongings and 

then go and stay with her friend by the name of Bill.  She said she will go to 

[facility], continue with outpatient counseling regarding substance abuse and coping 

skills.  She said she is interested in taking Suboxone even though she is very 

vague about her last opioid use.  Her drug screening was negative for opiates.  

She continues not to be psychotic.





This is a right ambulatory female with adequate hygiene.  She is polite 

cooperative and does not show any psychomotor agitation or retardation.  Her 

speech is spontaneous relevant and goal-directed.  However she appears to be 

interested in taking Suboxone in spite of counseling to the contrary.  Her mood 

is euthymic and affect is appropriate.  She denies hallucinations delusional thi

nking suicide and homicidal thoughts.  Her sensorium is clear.





A&P: Consider discharging her if the treatment team agrees with that.  Does not 

seem to be in need of Zyprexa anymore.

## 2023-04-11 NOTE — P.DS
Providers


Date of admission: 


04/07/23 04:01





Expected date of discharge: 04/11/23


Attending physician: 


Guillermo Garcia MD





Consults: 





                                        





04/07/23 04:23


Consult Physician Routine 


   Consulting Provider: [facility]


   Consult Reason/Comments: H&P medical managment


   Do you want consulting provider notified?: Already Contacted











Primary care physician: 


Stated None








- Discharge Diagnosis(es)


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 

unspecified


Current Visit: Yes   Status: Acute   Priority: High   





(2) Cannabis dependence


Current Visit: Yes   Status: Acute   Priority: High   


Hospital Course: 


Patient had psychiatric history and physical exam done by me on 04/07/2023.  She

had general physical examination done by Heriberto Gomez on 04/09/2023.  After her 

psychiatric H&P she was not started on any psychiatric medications since she was

not showing any behavioral problems because of psychosis.  However over the 

weekend the Law psychiatrist put her on Zyprexa and Suboxone since he thought 

patient needed Zyprexa to sleep better and Suboxone for opioid use disorder.  

Her Suboxone was discontinued by me on 04/10/2023 since her UDS was negative for

opiates, had not abused opiates for a long time and is not enrolled in Suboxone 

clinic.  Her Zyprexa was stopped today since people do have difficulty in 

sleeping when they abuse stimulants and Zyprexa is not recommended unless there 

is behavior problem.





Currently she is calm and cooperative, does not show any psychomotor agitation 

or retardation, speech is spontaneous relevant and goal-directed, mood is eut

hymic to cheerful and affect is appropriate to the thought content.  Denies 

hallucinations delusional thinking suicide and homicide thoughts.  Sensorium is 

clear.





She was advised to seek counseling regarding drug abuse and to learn better 

coping skills.  She said she will do that.


Patient Condition at Discharge: Stable





Plan - Discharge Summary


Discharge Rx Participant: No


New Discharge Prescriptions: 


No Action


   No Known Home Medications 


Discharge Medication List





No Known Home Medications  06/03/22 [History]








Follow up Appointment(s)/Referral(s): 


[facility] [Outside] - 04/12/23 12:30 pm (with Liseth)


None,Stated [Primary Care Provider] - 1-2 days


Activity/Diet/Wound Care/Special Instructions: 


Avoid the use of street drugs and alcohol.  Take all medications as prescribed. 

When you are in need of refills on your medications, please contact your medical

provider and/or outpatient psychiatrist to have this done.  Please go to 

scheduled outpatient appointments for aftercare treatment.  If symptoms return 

or become worse, call the crisis line at [phone] and/or go to the nearest

emergency room for evaluation.  


Plan of Treatment: 


Seek counseling regarding coping skills and drug and alcohol abuse.

## 2023-04-21 ENCOUNTER — HOSPITAL ENCOUNTER (INPATIENT)
Dept: HOSPITAL 47 - EC | Age: 48
LOS: 1 days | DRG: 812 | End: 2023-04-22
Attending: FAMILY MEDICINE | Admitting: FAMILY MEDICINE
Payer: COMMERCIAL

## 2023-04-21 VITALS — TEMPERATURE: 99.4 F

## 2023-04-21 DIAGNOSIS — E87.20: ICD-10-CM

## 2023-04-21 DIAGNOSIS — R68.0: ICD-10-CM

## 2023-04-21 DIAGNOSIS — J96.01: ICD-10-CM

## 2023-04-21 DIAGNOSIS — G25.3: ICD-10-CM

## 2023-04-21 DIAGNOSIS — N17.9: ICD-10-CM

## 2023-04-21 DIAGNOSIS — F17.210: ICD-10-CM

## 2023-04-21 DIAGNOSIS — Z66: ICD-10-CM

## 2023-04-21 DIAGNOSIS — F14.10: ICD-10-CM

## 2023-04-21 DIAGNOSIS — T43.621A: Primary | ICD-10-CM

## 2023-04-21 DIAGNOSIS — M96.A2: ICD-10-CM

## 2023-04-21 DIAGNOSIS — I21.A1: ICD-10-CM

## 2023-04-21 DIAGNOSIS — F31.30: ICD-10-CM

## 2023-04-21 DIAGNOSIS — N18.4: ICD-10-CM

## 2023-04-21 DIAGNOSIS — G93.1: ICD-10-CM

## 2023-04-21 DIAGNOSIS — F12.10: ICD-10-CM

## 2023-04-21 DIAGNOSIS — G93.6: ICD-10-CM

## 2023-04-21 DIAGNOSIS — R57.9: ICD-10-CM
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
## 2023-04-21 DIAGNOSIS — E87.5: ICD-10-CM

## 2023-04-21 DIAGNOSIS — K72.00: ICD-10-CM

## 2023-04-21 DIAGNOSIS — F43.10: ICD-10-CM

## 2023-04-21 DIAGNOSIS — J69.0: ICD-10-CM

## 2023-04-21 DIAGNOSIS — K21.9: ICD-10-CM

## 2023-04-21 DIAGNOSIS — J96.02: ICD-10-CM

## 2023-04-21 DIAGNOSIS — I12.9: ICD-10-CM

## 2023-04-21 DIAGNOSIS — I46.8: ICD-10-CM

## 2023-04-21 DIAGNOSIS — T43.651A: ICD-10-CM

## 2023-04-21 DIAGNOSIS — F10.21: ICD-10-CM

## 2023-04-21 DIAGNOSIS — Z20.822: ICD-10-CM

## 2023-04-21 LAB
ALBUMIN SERPL-MCNC: 3 G/DL (ref 3.5–5)
ALBUMIN SERPL-MCNC: 3.3 G/DL (ref 3.5–5)
ALP SERPL-CCNC: 76 U/L (ref 38–126)
ALP SERPL-CCNC: 90 U/L (ref 38–126)
ANION GAP SERPL CALC-SCNC: 13 MMOL/L
ANION GAP SERPL CALC-SCNC: 30 MMOL/L
APTT BLD: 46.5 SEC (ref 22–30)
AST SERPL-CCNC: 3413 U/L (ref 14–36)
BASOPHILS # BLD AUTO: 0.1 K/UL (ref 0–0.2)
BASOPHILS NFR BLD AUTO: 1 %
BUN SERPL-SCNC: 23 MG/DL (ref 7–17)
BUN SERPL-SCNC: 48 MG/DL (ref 7–17)
CALCIUM SPEC-MCNC: 6.5 MG/DL (ref 8.4–10.2)
CALCIUM SPEC-MCNC: 8.8 MG/DL (ref 8.4–10.2)
CHLORIDE SERPL-SCNC: 101 MMOL/L (ref 98–107)
CHLORIDE SERPL-SCNC: 106 MMOL/L (ref 98–107)
CO2 BLDA-SCNC: 10 MMOL/L (ref 19–24)
CO2 BLDA-SCNC: 13 MMOL/L (ref 19–24)
CO2 BLDA-SCNC: 18 MMOL/L (ref 19–24)
CO2 SERPL-SCNC: 22 MMOL/L (ref 22–30)
CO2 SERPL-SCNC: 9 MMOL/L (ref 22–30)
EOSINOPHIL # BLD AUTO: 0.1 K/UL (ref 0–0.7)
EOSINOPHIL NFR BLD AUTO: 1 %
ERYTHROCYTE [DISTWIDTH] IN BLOOD BY AUTOMATED COUNT: 4.04 M/UL (ref 3.8–5.4)
ERYTHROCYTE [DISTWIDTH] IN BLOOD BY AUTOMATED COUNT: 4.69 M/UL (ref 3.8–5.4)
ERYTHROCYTE [DISTWIDTH] IN BLOOD: 13.6 % (ref 11.5–15.5)
ERYTHROCYTE [DISTWIDTH] IN BLOOD: 13.8 % (ref 11.5–15.5)
GLUCOSE BLD-MCNC: 193 MG/DL (ref 70–110)
GLUCOSE BLD-MCNC: 90 MG/DL (ref 70–110)
GLUCOSE SERPL-MCNC: 187 MG/DL (ref 74–99)
GLUCOSE SERPL-MCNC: 81 MG/DL (ref 74–99)
HCO3 BLDA-SCNC: 12 MMOL/L (ref 21–25)
HCO3 BLDA-SCNC: 16 MMOL/L (ref 21–25)
HCO3 BLDA-SCNC: 8 MMOL/L (ref 21–25)
HCT VFR BLD AUTO: 43.5 % (ref 34–46)
HCT VFR BLD AUTO: 45 % (ref 34–46)
HGB BLD-MCNC: 12.4 GM/DL (ref 11.4–16)
HGB BLD-MCNC: 14.1 GM/DL (ref 11.4–16)
HYALINE CASTS UR QL AUTO: 5 /LPF (ref 0–2)
INR PPP: 1.7 (ref ?–1.2)
LACTATE BLDV-SCNC: 20.4 MMOL/L (ref 0.7–2)
LYMPHOCYTES # SPEC AUTO: 3.1 K/UL (ref 1–4.8)
LYMPHOCYTES NFR SPEC AUTO: 15 %
MCH RBC QN AUTO: 30.1 PG (ref 25–35)
MCH RBC QN AUTO: 30.6 PG (ref 25–35)
MCHC RBC AUTO-ENTMCNC: 28.4 G/DL (ref 31–37)
MCHC RBC AUTO-ENTMCNC: 31.3 G/DL (ref 31–37)
MCV RBC AUTO: 107.7 FL (ref 80–100)
MCV RBC AUTO: 96 FL (ref 80–100)
MONOCYTES # BLD AUTO: 1.4 K/UL (ref 0–1)
MONOCYTES NFR BLD AUTO: 7 %
NEUTROPHILS # BLD AUTO: 15.4 K/UL (ref 1.3–7.7)
NEUTROPHILS NFR BLD AUTO: 76 %
PCO2 BLDA: 36 MMHG (ref 35–45)
PCO2 BLDA: 42 MMHG (ref 35–45)
PCO2 BLDA: 59 MMHG (ref 35–45)
PH BLDA: 7.07 [PH] (ref 7.35–7.45)
PH BLDA: 7.27 [PH] (ref 7.35–7.45)
PH BLDA: <6.8 [PH] (ref 7.35–7.45)
PH UR: 5.5 [PH] (ref 5–8)
PLATELET # BLD AUTO: 169 K/UL (ref 150–450)
PO2 BLDA: 91 MMHG (ref 83–108)
PO2 BLDA: 94 MMHG (ref 83–108)
PO2 BLDA: >400 MMHG (ref 83–108)
POTASSIUM SERPL-SCNC: 5.4 MMOL/L (ref 3.5–5.1)
POTASSIUM SERPL-SCNC: 6.3 MMOL/L (ref 3.5–5.1)
PROT SERPL-MCNC: 5.4 G/DL (ref 6.3–8.2)
PROT SERPL-MCNC: 5.7 G/DL (ref 6.3–8.2)
PROT UR QL: (no result)
PT BLD: 16.8 SEC (ref 9–12)
RBC UR QL: 1 /HPF (ref 0–5)
SODIUM SERPL-SCNC: 140 MMOL/L (ref 137–145)
SODIUM SERPL-SCNC: 141 MMOL/L (ref 137–145)
SP GR UR: 1.01 (ref 1–1.03)
SQUAMOUS UR QL AUTO: 4 /HPF (ref 0–4)
UROBILINOGEN UR QL STRIP: <2 MG/DL (ref ?–2)
WBC # BLD AUTO: 20.4 K/UL (ref 3.8–10.6)
WBC # BLD AUTO: 5.4 K/UL (ref 3.8–10.6)
WBC #/AREA URNS HPF: 2 /HPF (ref 0–5)

## 2023-04-21 PROCEDURE — 71260 CT THORAX DX C+: CPT

## 2023-04-21 PROCEDURE — 85025 COMPLETE CBC W/AUTO DIFF WBC: CPT

## 2023-04-21 PROCEDURE — 82805 BLOOD GASES W/O2 SATURATION: CPT

## 2023-04-21 PROCEDURE — 80053 COMPREHEN METABOLIC PANEL: CPT

## 2023-04-21 PROCEDURE — 85730 THROMBOPLASTIN TIME PARTIAL: CPT

## 2023-04-21 PROCEDURE — 81001 URINALYSIS AUTO W/SCOPE: CPT

## 2023-04-21 PROCEDURE — 85027 COMPLETE CBC AUTOMATED: CPT

## 2023-04-21 PROCEDURE — 36415 COLL VENOUS BLD VENIPUNCTURE: CPT

## 2023-04-21 PROCEDURE — 93306 TTE W/DOPPLER COMPLETE: CPT

## 2023-04-21 PROCEDURE — 70450 CT HEAD/BRAIN W/O DYE: CPT

## 2023-04-21 PROCEDURE — 87205 SMEAR GRAM STAIN: CPT

## 2023-04-21 PROCEDURE — 83605 ASSAY OF LACTIC ACID: CPT

## 2023-04-21 PROCEDURE — 3E043XZ INTRODUCTION OF VASOPRESSOR INTO CENTRAL VEIN, PERCUTANEOUS APPROACH: ICD-10-PCS

## 2023-04-21 PROCEDURE — 93005 ELECTROCARDIOGRAM TRACING: CPT

## 2023-04-21 PROCEDURE — 87070 CULTURE OTHR SPECIMN AEROBIC: CPT

## 2023-04-21 PROCEDURE — 94003 VENT MGMT INPAT SUBQ DAY: CPT

## 2023-04-21 PROCEDURE — 0D9670Z DRAINAGE OF STOMACH WITH DRAINAGE DEVICE, VIA NATURAL OR ARTIFICIAL OPENING: ICD-10-PCS

## 2023-04-21 PROCEDURE — 85610 PROTHROMBIN TIME: CPT

## 2023-04-21 PROCEDURE — 5A1935Z RESPIRATORY VENTILATION, LESS THAN 24 CONSECUTIVE HOURS: ICD-10-PCS

## 2023-04-21 PROCEDURE — 96375 TX/PRO/DX INJ NEW DRUG ADDON: CPT

## 2023-04-21 PROCEDURE — 83880 ASSAY OF NATRIURETIC PEPTIDE: CPT

## 2023-04-21 PROCEDURE — 36600 WITHDRAWAL OF ARTERIAL BLOOD: CPT

## 2023-04-21 PROCEDURE — 99291 CRITICAL CARE FIRST HOUR: CPT

## 2023-04-21 PROCEDURE — 71045 X-RAY EXAM CHEST 1 VIEW: CPT

## 2023-04-21 PROCEDURE — 96365 THER/PROPH/DIAG IV INF INIT: CPT

## 2023-04-21 PROCEDURE — 36556 INSERT NON-TUNNEL CV CATH: CPT

## 2023-04-21 PROCEDURE — 82140 ASSAY OF AMMONIA: CPT

## 2023-04-21 PROCEDURE — 84484 ASSAY OF TROPONIN QUANT: CPT

## 2023-04-21 PROCEDURE — 74177 CT ABD & PELVIS W/CONTRAST: CPT

## 2023-04-21 PROCEDURE — 96366 THER/PROPH/DIAG IV INF ADDON: CPT

## 2023-04-21 PROCEDURE — 80306 DRUG TEST PRSMV INSTRMNT: CPT

## 2023-04-21 PROCEDURE — 06HY33Z INSERTION OF INFUSION DEVICE INTO LOWER VEIN, PERCUTANEOUS APPROACH: ICD-10-PCS

## 2023-04-21 PROCEDURE — 96368 THER/DIAG CONCURRENT INF: CPT

## 2023-04-21 PROCEDURE — 87636 SARSCOV2 & INF A&B AMP PRB: CPT

## 2023-04-21 PROCEDURE — 80320 DRUG SCREEN QUANTALCOHOLS: CPT

## 2023-04-21 RX ADMIN — PIPERACILLIN AND TAZOBACTAM SCH MLS/HR: 3; .375 INJECTION, POWDER, FOR SOLUTION INTRAVENOUS at 15:10

## 2023-04-21 NOTE — P.CNPUL
History of Present Illness


Consult date: 04/21/23


Chief complaint: Cardiac arrest


History of present illness: 








Patient is a 47 year female presents as a department for cardiac arrest.  Last 

known well was  3 or 3:30 this morning.  Was sleeping on the couch with family's

or friends.  History obtained from EMS.  When they woke at approximately 6 AM, 

they called EMS as she was not responsive.  No CPR at the scene down by 

bystanders.  When EMS arrived, patient was pulseless.  CPR was immediately 

started.  Compressions for approximately 15 minutes at the scene.  Received a 

total of 1 of epinephrine, 1 amp of D50, and Narcan.  Rosc was achieved.  She 

was intubated with a 6.0.  Based on my investigation, the patient was done with 

asystole and she received a total of 50 minutes of CPR and she had a tetanus 

with his circulation.  Nevertheless, the exact timing when she was down is not 

known and the patient could've been down in cardiac arrest for longer period of 

time.  In [facility], the patient is currently completely unresponsive and 

comatose.  She is on a mechanical ventilator.  She is on assist control rate of 

16 with tidal volume of 450, FiO2 of 80% and a PEEP of 5.  She is able to 

breathe above the mechanical ventilator and her current respiratory rate is 

around 33.  Her current pulse ox is 94%.  Her chest x-ray showed adequate 

positioning of the ET tube.  The patient also had right perihilar infiltrate.  

The patient was given a CAT scan of the chest abdomen and pelvis.  I reviewed 

the CAT scan of the chest and there is evidence of bibasilar pulmonary 

infiltrates which could potentially indicate possibility of an aspiration.  

Infiltrate is worse on the left compared to the right.  As for the CAT scan of 

the abdomen and pelvis, no other abnormalities were noted.  There was an 

anterior rib fracture, involving the fourth rib on the left and the patient also

had a distended gastric lumen/gastric contents probably related to CPR and 

resuscitation.  CAT scan of the brain was also done that showed evidence of 

diffuse loss of gray-white matter differentiation consistent with anoxic 

encephalopathy.  The EKG showed initially sinus bradycardia.  The patient is 

currently in normal sinus rhythm.  No acute ST segment changes.  The patient had

minimal elevation of troponin of 0.17 and 0.25 respectively 2.  The proBNP 

level was 179.  The patient is currently hypotensive and the patient is 

currently on norepinephrine running at 0.16 microvascular kilogram per minute.  

She is severely acidotic.  Initial blood gases showed a pH of 6.8 with a pCO2 of

59 and pO2 of more than 400 and this was identified to 100%.  The patient also 

had severe lactic acidosis and the lactic acid level was at 20.4.  There is 

evidence of shock liver with elevated liver function tests.  The patient has a 

serum bicarb of 9, potassium level of 6.3 and a sodium level of 140.  The white 

cell count of 20.4 with a hemoglobin 12.4.











Covid was administered thrombolytic with so 





Review of Systems


ROS unobtainable: due to endotracheal tube





Past Medical History


Past Medical History: No Reported History


Additional Past Medical History / Comment(s): History of depression, history of 

cannabis abuse


History of Any Multi-Drug Resistant Organisms: None Reported


Past Surgical History: Tubal Ligation


Additional Past Surgical History / Comment(s): LEFT LEG R/T TRAUMA, BLADDER 

SURGERY


Past Anesthesia/Blood Transfusion Reactions: No Reported Reaction


Past Psychological History: Bipolar, Depression, PTSD


Smoking Status: Current every day smoker


Past Alcohol Use History: Daily


Past Drug Use History: Marijuana





- Past Family History


  ** Mother


Family Medical History: COPD





Medications and Allergies


                                Home Medications











 Medication  Instructions  Recorded  Confirmed  Type


 


No Known Home Medications  06/03/22 04/21/23 History








                                    Allergies











Allergy/AdvReac Type Severity Reaction Status Date / Time


 


No Known Allergies Allergy   Verified 04/21/23 07:08














Physical Exam


Vitals: 


                                   Vital Signs











  Temp Pulse Resp BP BP Pulse Ox FiO2


 


 04/21/23 12:13        80


 


 04/21/23 12:12        80


 


 04/21/23 12:00  94.5 F L  85  30 H  101/45   89 L 


 


 04/21/23 11:45  94.3 F L  77  16  108/54   88 L 


 


 04/21/23 11:26        50


 


 04/21/23 11:00  90.3 F L  64  20  122/68   100 


 


 04/21/23 10:00  86.5 F L  53 L  16  116/67   100 


 


 04/21/23 09:30  85.1 F L  46 L  16  125/77   100 


 


 04/21/23 09:00  83.8 F L  57 L  16  144/85   100 


 


 04/21/23 08:45     150/92   


 


 04/21/23 08:35  83.5 F L  55 L  16  142/89   100 


 


 04/21/23 08:33        100


 


 04/21/23 08:09  83.5 F L  56 L  16  124/79   100 


 


 04/21/23 08:00  82.9 F L  51 L  16  118/71   100 04/21/23 07:50  83.1 F L  52 L  16  104/64   


 


 04/21/23 07:42  83.1 F L  52 L  16  84/49   100 


 


 04/21/23 07:35   53 L  18  85/51   100 


 


 04/21/23 07:29     73/43   


 


 04/21/23 07:20   52 L  16  70/43   100 


 


 04/21/23 07:19   52 L  18  67/41   100 


 


 04/21/23 07:08      52/32  


 


 04/21/23 06:50      59/35  


 


 04/21/23 06:45      60/27  


 


 04/21/23 06:40      42/29  


 


 04/21/23 06:28        100








                                Intake and Output











 04/20/23 04/21/23 04/21/23





 22:59 06:59 14:59


 


Intake Total  3.499 10.291


 


Balance  3.499 10.291


 


Intake:   


 


  Intake, IV Titration  3.499 10.291





  Amount   


 


    Norepinephrine 32 mg In   10.291





    Sodium Chloride 0.9% 218   





    ml @ 0.03 MCG/KG/MIN 0.   





    861 mls/hr IV .Q24H ONE   





    Rx#:[id number]   


 


    Norepinephrine 4 mg In  3.499 





    Sodium Chloride 0.9% 250   





    ml @ 0.03 MCG/KG/MIN 6.   





    999 mls/hr IV .Q24H ONE   





    Rx#:[id number]   


 


Other:   


 


  Weight  61.235 kg 














Patient is unresponsive, comatose, currently on no sedation.  Not responding to 

any verbal or painful stimulation.


Head exam was generally normal. There was no scleral icterus or corneal arcus. 

Mucous membranes were moist.


Neck was supple and without jugular venous distension, thyromegaly, or carotid 

bruits. Carotids were easily palpable bilaterally. There was no adenopathy.  

Orogastric and orotracheal tube are both in place.


Lungs were clear to auscultation and percussion, and with normal diaphragmatic 

excursion. No wheezes or rales were noted. 


Cardiac exam revealed the PMI to be normally situated and sized. The rhythm was 

regular and no extrasystoles were noted during several minutes of auscultation. 

The first and second heart sounds were normal and physiologic splitting of the 

second heart sound was noted. There were no murmurs, rubs, clicks, or gallops.


Abdominal exam revealed normal bowel sounds. The abdomen was soft, non-tender, 

and without masses, organomegaly, or appreciable enlargement of the abdominal 

aorta.


Examination of the extremities revealed easily palpable radial, femoral and 

pedal pulses. There was no cyanosis, clubbing or edema.  The patient is a 

triple-lumen catheter in her left femoral vein


Examination of the skin revealed no evidence of significant rashes, suspicious 

appearing nevi or other concerning lesions.


Neurologically, the patient is comatose and unresponsive.  Pupils are round 3 mm

in size and sluggishly reactive to light.  Unable to elicit any corneal reflex. 

No gag reflex.  She is able to breathe above the mechanical ventilator and she 

is triggering the ventilator for now.  Motor function is absent.  Sensory 

functions cannot be obtained.  Reflexes are diminished and symmetrical.  

Negative for Babinski or clonus.  No abnormal jerking activity.  No seizure 

activity has been noted.





Results





- Laboratory Findings


CBC and BMP: 


                                 04/21/23 06:45





                                 04/21/23 06:45


ABG











ABG pH  <6.80  (7.35-7.45)  L*  04/21/23  07:20    


 


ABG pCO2  59 mmHg (35-45)  H  04/21/23  07:20    


 


ABG pO2  >400 mmHg ()  H  04/21/23  07:20    


 


ABG O2 Saturation  99.9 % (94-97)  H  04/21/23  07:20    





PT/INR, D-dimer











PT  16.8 sec (9.0-12.0)  H  04/21/23  06:45    


 


INR  1.7  (<1.2)  H  04/21/23  06:45    








Abnormal lab findings: 


                                  Abnormal Labs











  04/21/23 04/21/23 04/21/23





  06:45 06:45 06:45


 


WBC  20.4 H  


 


MCV  107.7 H D  


 


MCHC  28.4 L  


 


Neutrophils #  15.4 H  


 


Monocytes #  1.4 H  


 


PT   16.8 H 


 


INR   1.7 H 


 


APTT   46.5 H 


 


ABG pH   


 


ABG pCO2   


 


ABG pO2   


 


ABG HCO3   


 


ABG Total CO2   


 


ABG O2 Saturation   


 


Potassium    6.3 H*


 


Carbon Dioxide    9 L*


 


BUN    23 H


 


Creatinine    2.61 H


 


Glucose    187 H


 


POC Glucose (mg/dL)   


 


Plasma Lactic Acid Brent   


 


AST    3413 H


 


ALT    2679 H


 


Ammonia   


 


Troponin I   


 


Total Protein    5.7 L


 


Albumin    3.3 L


 


Urine Appearance   


 


Urine Protein   


 


Urine Glucose (UA)   


 


Urine Blood   


 


Urine Bacteria   


 


Hyaline Casts   


 


Urine Mucus   


 


Ur Amphetamines Screen   


 


U Methamphetamines Scrn   


 


Urine Cocaine Screen   


 


U Marijuana (THC) Screen   














  04/21/23 04/21/23 04/21/23





  06:45 06:45 06:50


 


WBC   


 


MCV   


 


MCHC   


 


Neutrophils #   


 


Monocytes #   


 


PT   


 


INR   


 


APTT   


 


ABG pH   


 


ABG pCO2   


 


ABG pO2   


 


ABG HCO3   


 


ABG Total CO2   


 


ABG O2 Saturation   


 


Potassium   


 


Carbon Dioxide   


 


BUN   


 


Creatinine   


 


Glucose   


 


POC Glucose (mg/dL)    193 H


 


Plasma Lactic Acid Brent   20.4 H* 


 


AST   


 


ALT   


 


Ammonia   447 H 


 


Troponin I  0.175 H*  


 


Total Protein   


 


Albumin   


 


Urine Appearance   


 


Urine Protein   


 


Urine Glucose (UA)   


 


Urine Blood   


 


Urine Bacteria   


 


Hyaline Casts   


 


Urine Mucus   


 


Ur Amphetamines Screen   


 


U Methamphetamines Scrn   


 


Urine Cocaine Screen   


 


U Marijuana (THC) Screen   














  04/21/23 04/21/23 04/21/23





  07:00 07:00 07:20


 


WBC   


 


MCV   


 


MCHC   


 


Neutrophils #   


 


Monocytes #   


 


PT   


 


INR   


 


APTT   


 


ABG pH    <6.80 L*


 


ABG pCO2    59 H


 


ABG pO2    >400 H


 


ABG HCO3    8 L*


 


ABG Total CO2    10 L


 


ABG O2 Saturation    99.9 H


 


Potassium   


 


Carbon Dioxide   


 


BUN   


 


Creatinine   


 


Glucose   


 


POC Glucose (mg/dL)   


 


Plasma Lactic Acid Brent   


 


AST   


 


ALT   


 


Ammonia   


 


Troponin I   


 


Total Protein   


 


Albumin   


 


Urine Appearance  Cloudy H  


 


Urine Protein  2+ H  


 


Urine Glucose (UA)  Trace H  


 


Urine Blood  Trace H  


 


Urine Bacteria  Rare H  


 


Hyaline Casts  5 H  


 


Urine Mucus  Rare H  


 


Ur Amphetamines Screen   Detected H 


 


U Methamphetamines Scrn   Detected H 


 


Urine Cocaine Screen   Detected H 


 


U Marijuana (THC) Screen   Detected H 














  04/21/23 04/21/23





  10:26 11:02


 


WBC  


 


MCV  


 


MCHC  


 


Neutrophils #  


 


Monocytes #  


 


PT  


 


INR  


 


APTT  


 


ABG pH  


 


ABG pCO2  


 


ABG pO2  


 


ABG HCO3  


 


ABG Total CO2  


 


ABG O2 Saturation  


 


Potassium  


 


Carbon Dioxide  


 


BUN  


 


Creatinine  


 


Glucose  


 


POC Glucose (mg/dL)  


 


Plasma Lactic Acid Brent   18.0 H*


 


AST  


 


ALT  


 


Ammonia  


 


Troponin I  0.259 H* 


 


Total Protein  


 


Albumin  


 


Urine Appearance  


 


Urine Protein  


 


Urine Glucose (UA)  


 


Urine Blood  


 


Urine Bacteria  


 


Hyaline Casts  


 


Urine Mucus  


 


Ur Amphetamines Screen  


 


U Methamphetamines Scrn  


 


Urine Cocaine Screen  


 


U Marijuana (THC) Screen  














- Diagnostic Findings


Chest x-ray: image reviewed





Assessment and Plan


Plan: 











Cardiac arrest, exact downtime is unknown.  Note the patient received CPR for 

total of 15 minutes and there was return of spontaneous circulation.





Shock, hypotension post cardiac arrest and the patient is currently on pressors





Severe anoxic encephalopathy with evidence of CNS edema, please refer to the CAT

scan





Hypothermia, current temperature is at





severe lactic acidosis post cardiac arrest





Bilateral lower lobe pulmonary infiltrates left more than right, consider 

aspiration





Acute leukocytosis





Polysubstance abuse in the urine drug screen was positive for amphetamines, 

cocaine and marijuana





Troponin leak secondary to cardiac arrest





Left fourth rib fracture, likely secondary to CPR





Plan





Continue ventilator support.  Increase the tidal volume of 500.  Increase 

respiratory rate of 26


IV fluids are currently in the form of normal saline at the rate of 130 mL an 

hour.,  The patient already received a total of 2 L of IV fluid normal saline 

bolus


Continue pressors and titrate MAP   above 65


Echocardiogram


Cardiology consultation


Neurology consultation


Monitor temperature and allow hypothermia.  Current temperature is at 96.1.


Avoid any form of sedations for now


Monitor neurologic outcome and functions


May need to repeat another CAT scan within next 24 hours and EEG


IV Zosyn as an empiric antibiotic coverage for potential aspiration


Lovenox 40 mg subcu for he did DVT prophylaxis


IV Protonix


We'll continue to follow.  Condition is critical.  Outcome is obviously poor 

based above-mentioned comorbidities.  The patient likely had a prolonged 

downtime.


Time with Patient: Greater than 30

## 2023-04-21 NOTE — XR
EXAMINATION TYPE: XR chest 1V portable

 

DATE OF EXAM: 4/21/2023

 

COMPARISON: None

 

INDICATION: Tube placement, cardiac arrest

 

TECHNIQUE: Single frontal view of the chest is obtained.

 

FINDINGS:  

The heart size is normal.  

The pulmonary vasculature is normal.  

There appears to be increased lung markings in the right perihilar region. Infiltrate or underlying m
ass may be present. Follow-up is recommended.  

Endotracheal tube tip is 5 cm above. Nasogastric tube transverses the thorax with tip out of the fiel
d of view within the abdomen

 

IMPRESSION:  

1. Right perihilar filtrate appears to be present. Follow-up is recommended.

2. Lines and catheters discussed above.

## 2023-04-21 NOTE — CT
EXAMINATION TYPE: CT brain wo con

 

DATE OF EXAM: 4/21/2023

 

COMPARISON: None

 

INDICATION: Altered mental status, cardiac arrest

 

DLP: 1099.4 mGycm, Automated exposure control for dose reduction was used.

 

CONTRAST: None

 

CT of the brain is performed utilizing 3 mm thick sections through the posterior fossa and 3 mm thick
 sections through the remaining calvarium.  Study is performed within 24 hours of arrival to the hosp
ital. 

 

No abnormal hyperdensity is present to suggest an acute intracranial hemorrhage.

No mass lesion is evident.

There is loss of the gray-white matter differentiation which can be associated with anoxic injury.

Ventricles appear normal. No temporal horn dilatation is evident. Sulci are poorly visualized. Claudia
geminal plate and ambient cistern are visualized.

Air-fluid levels are within the maxillary sinuses. Mucosal thickenings within ethmoid air cells. Ther
e is fluid within the sphenoid sinuses. Correlate for acute sinusitis.

 

IMPRESSIONS:

1.   Diffuse loss of gray-white matter differentiation and poor visualization of sulci. Findings can 
be associated with anoxic injury.

## 2023-04-21 NOTE — CT
EXAMINATION TYPE: CT ChestAbdPelvis w con

CT DLP: 879.2 mGycm, Automated exposure control for dose reduction was used.

 

DATE OF EXAM: 4/21/2023 8:37 AM

 

COMPARISON: None. 

 

CLINICAL INDICATION:Female, 47 years old with history of cardiac arrest, Altered mental status, cardi
ac arrest

 

Technique: Multiple axial images of the chest, abdomen, and pelvis were obtained. Two-dimensional cor
onal and sagittal reconstructions were obtained. 

Contrast used:100 mL of Isovue 300 with IV Contrast, 

Oral contrast used: without Oral Contrast 

 

Findings: 

 

CHEST:

LUNGS/ PLEURA: There is consolidation changes to the lung bases bilaterally. There is thickening of i
nterlobular septa.

AIRWAY: Endotracheal tube terminates above the willie.

HEART: Size within normal limits.

MEDIASTINUM: No gross evidence of adenopathy. Nasogastric tube distends into the abdomen.

VASCULATURE:  No aortic aneurysm. 

MUSCULOSKELETAL: Completely displaced left rib for fracture anteriorly.

SOFT TISSUES/LYMPH NODES: Unremarkable.

LOWER NECK: No significant findings.

 

ABDOMEN:

ABDOMEN

LIVER: Unremarkable

GALLBLADDER AND BILE DUCTS: Unremarkable.

PANCREAS: Unremarkable.

SPLEEN: Unremarkable.

ADRENAL GLANDS: Unremarkable.

KIDNEYS AND URETERS: No evidence of hydronephrosis or renal calculus. The ureters are unremarkable.  


 

PELVIS

BLADDER: Nondistended with Bermeo catheter in place.

REPRODUCTIVE: Unremarkable.

 

ABDOMEN & PELVIS

STOMACH AND BOWEL: No evidence of bowel obstruction. Gastric lumen is distended with air and ingested
 contents.

PERITONEUM: No evidence of pneumoperitoneum or free fluid.

 

VASCULATURE: No evidence of aortic aneurysm. Left femoral central venous catheter with tip terminatin
g in the common iliac vein. Atherosclerosis of the arterial vasculature.

MUSCULOSKELETAL: No acute osseous abnormalities involving the lumbar spine or pelvis. Multilevel disc
 degeneration changes throughout the spine.

LYMPH NODES: No gross evidence for lymphadenopathy.

SOFT TISSUE/ABDOMINAL WALL: Unremarkable

 

IMPRESSION: 

1.  Pulmonary vascular congestion compatible with cardiac arrest.

2.  Bibasilar airspace opacities concerning for aspiration.

3.  Endotracheal and nasogastric tubes in appropriate position.

4.  Bermeo catheter in appropriate position.

5.  Left femoral central venous catheter with tip in appropriate position.

6.  Distended gastric lumen with air and ingested contents.

7.  Left completely displaced anterior rib 4 fracture.

## 2023-04-21 NOTE — ED
General Adult HPI





- General


Source: EMS, RN notes reviewed, old records reviewed


Mode of arrival: EMS


Limitations: no limitations





<Acosta Coffey - Last Filed: 04/21/23 08:54>





<Kevon Quinteros - Last Filed: 04/21/23 10:15>





- General


Chief complaint: Cardiac Arrest/CPR


Stated complaint: Cardiac Arrest ROSC


Time Seen by Provider: 04/21/23 06:28





- History of Present Illness


Initial comments: 





Patient is a 47 year female presents as a department for cardiac arrest.  Last 

known well was  3 or 3:30 this morning.  Was sleeping on the couch with family's

or friends.  History obtained from EMS.  When they woke at approximately 6 AM, 

they called EMS as she was not responsive.  No CPR at the scene down by 

bystanders.  When EMS arrived, patient was pulseless.  CPR was immediately 

started.  Compressions for approximately 15 minutes at the scene.  Received a 

total of 1 of epinephrine, 1 amp of D50, and Narcan.  Rosc was achieved.  She 

was intubated with a 6.0 ET tube.  Brought to the emergency department for fur

ther management.  Was hypotensive at the scene as well as tachycardic.  

Oxygenating well.  Patient does have a history of polysubstance abuse. 

(Acosta Coffey)





- Related Data


                                Home Medications











 Medication  Instructions  Recorded  Confirmed


 


No Known Home Medications  06/03/22 04/21/23











                                    Allergies











Allergy/AdvReac Type Severity Reaction Status Date / Time


 


No Known Allergies Allergy   Verified 04/21/23 07:08














Review of Systems


ROS Other: All systems not noted in ROS Statement are negative.





<Acosta Coffey - Last Filed: 04/21/23 08:54>


ROS Other: All systems not noted in ROS Statement are negative.





<Kevon Quinteros - Last Filed: 04/21/23 10:15>


ROS Statement: 


Those systems with pertinent positive or pertinent negative responses have been 

documented in the HPI.








Past Medical History


Past Medical History: No Reported History


History of Any Multi-Drug Resistant Organisms: None Reported


Past Surgical History: Tubal Ligation


Additional Past Surgical History / Comment(s): LEFT LEG R/T TRAUMA, BLADDER 

SURGERY


Past Anesthesia/Blood Transfusion Reactions: No Reported Reaction


Past Psychological History: Bipolar, Depression, PTSD


Smoking Status: Current every day smoker


Past Alcohol Use History: Daily


Past Drug Use History: Marijuana





- Past Family History


  ** Mother


Family Medical History: COPD





<Acosta Coffey - Last Filed: 04/21/23 08:54>





General Exam


Limitations: no limitations





<AlexxAcosta - Last Filed: 04/21/23 08:54>





- General Exam Comments


Initial Comments: 





General: Unresponsive


HEAD:  Normal with no signs of head trauma.


EYES: Pupils are fixed, dilated.


ENT: Normal oropharynx Trachea midline


RESPIRATORY: Clear breath sounds bilaterally.  ET tube in place, placement 

confirmed.


C/V: Tachycardic with a regular rhythm.  Weak pulses.


ABD: Distended abdomen.


EXT: Normal range of motion, no obvious deformity


SKIN:  No rashes or lesions observed on exposed skin.


NEURO: Unresponsive


 (Acosta Coffey)





Course


                                   Vital Signs











  04/21/23 04/21/23 04/21/23





  06:28 06:40 06:45


 


Temperature   


 


Pulse Rate   


 


Respiratory   





Rate   


 


Blood Pressure   


 


Blood Pressure  42/29 60/27





[Left Arm]   


 


O2 Sat by Pulse   





Oximetry   


 


Fraction of 100  





Inspired Oxygen   





(FIO2)   














  04/21/23 04/21/23 04/21/23





  06:50 07:07 07:08


 


Temperature   


 


Pulse Rate   


 


Respiratory   





Rate   


 


Blood Pressure   


 


Blood Pressure 59/35  52/32





[Left Arm]   


 


O2 Sat by Pulse   





Oximetry   


 


Fraction of  100 





Inspired Oxygen   





(FIO2)   














  04/21/23 04/21/23 04/21/23





  07:19 07:20 07:29


 


Temperature   


 


Pulse Rate 52 L 52 L 


 


Respiratory 18 16 





Rate   


 


Blood Pressure 67/41 70/43 73/43


 


Blood Pressure   





[Left Arm]   


 


O2 Sat by Pulse 100 100 





Oximetry   


 


Fraction of   





Inspired Oxygen   





(FIO2)   














  04/21/23 04/21/23 04/21/23





  07:35 07:42 07:50


 


Temperature  83.1 F L 83.1 F L


 


Pulse Rate 53 L 52 L 52 L


 


Respiratory 18 16 16





Rate   


 


Blood Pressure 85/51 84/49 104/64


 


Blood Pressure   





[Left Arm]   


 


O2 Sat by Pulse 100 100 





Oximetry   


 


Fraction of   





Inspired Oxygen   





(FIO2)   














  04/21/23 04/21/23 04/21/23





  08:00 08:09 08:33


 


Temperature 82.9 F L 83.5 F L 


 


Pulse Rate 51 L 56 L 


 


Respiratory 16 16 





Rate   


 


Blood Pressure 118/71 124/79 


 


Blood Pressure   





[Left Arm]   


 


O2 Sat by Pulse 100 100 





Oximetry   


 


Fraction of   100





Inspired Oxygen   





(FIO2)   














  04/21/23 04/21/23 04/21/23





  08:35 08:45 09:00


 


Temperature 83.5 F L  83.8 F L


 


Pulse Rate 55 L  57 L


 


Respiratory 16  16





Rate   


 


Blood Pressure 142/89 150/92 144/85


 


Blood Pressure   





[Left Arm]   


 


O2 Sat by Pulse 100  100





Oximetry   


 


Fraction of   





Inspired Oxygen   





(FIO2)   














  04/21/23 04/21/23





  09:30 10:00


 


Temperature 85.1 F L 86.5 F L


 


Pulse Rate 46 L 53 L


 


Respiratory 16 16





Rate  


 


Blood Pressure 125/77 116/67


 


Blood Pressure  





[Left Arm]  


 


O2 Sat by Pulse 100 100





Oximetry  


 


Fraction of  





Inspired Oxygen  





(FIO2)  














Procedures





- Central Line Placement


  ** Left Femoral


Consent Obtained: emergent situation


Patient Placed on Monitor/Pulse Ox: Yes


MD Prep: mask, gown, gloves


Central Line Prep: Chlorhexidine scrub


Local Anesthesia Used: Lidocaine 1%


Amount of Anesthesia Used (mls): 3


Ultrasound Used for Placement: Yes


Central Line Lumen Inserted: triple


Bloods Obtained for Lab: Yes


Central Line Position: good blood return, all ports aspirated, flushed, capped, 

sutured in place with nylon


Dressing Applied: Tegaderm


Patient Tolerated Procedure: well


Complications: none





<Acosta Coffey - Last Filed: 04/21/23 08:54>





- Sepsis


  ** Sepsis Focused Exam #1


Time Sepsis Criteria Met: 08:00


Sepsis Focused Exam Date: 04/21/23


Sepsis Focused Exam Time: 08:23


Sepsis Focused Exam Complete: Yes


Vital Signs & RN Notes Reviewed: Yes


Capillary Refill: > 2 Seconds: Fingers


Peripheral Pulses: Weak: Radial (R)


Skin Color: Pallor


Respiratory Exam: other (Normal but she is on a ventilator)


Cardiovascular Exam: bradycardia





<Kevon Quinteros - Last Filed: 04/21/23 10:15>





Medical Decision Making





- Lab Data


Result diagrams: 


                                 04/21/23 06:45





                                 04/21/23 06:45





- EKG Data


-: EKG Interpreted by Me





<Acosta Coffey - Last Filed: 04/21/23 08:54>





- Lab Data


Result diagrams: 


                                 04/21/23 06:45





                                 04/21/23 06:45





<Kevon Quinteros - Last Filed: 04/21/23 10:15>





- Medical Decision Making





Was pt. sent in by a medical professional or institution (Dr., PA, NP, urgent 

care, hospital, or nursing home...) When possible be specific


@  -No


Did you speak to anyone other than the patient for history (EMS, parent, family,

police, friend...)? What history was obtained from this source 


@  -No


Did you review nursing and triage notes (agree or disagree)?  Why? 


@  -I reviewed and agree with nursing and triage notes


Were old charts reviewed (outside hosp., previous admission, EMS record, old 

EKG, old radiological studies, urgent care reports/EKG's, nursing home records)?

Report findings 


@  -Reviewed charts from April 2023 earlier this month when she was admitted for

psychiatric services


Differential Diagnosis (chest pain, altered mental status, abdominal pain women,

abdominal pain men, vaginal bleeding, weakness, fever, dyspnea, syncope, 

headache, dizziness, GI bleed, back pain, seizure, CVA, palpatations, mental 

health, musculoskeletal)? 


@  -Differential Altered Mental Status:


Hypoglycemia, DKA, hypercapnia, ETOH, overdose, CO poisoning, trauma, myxedema 

coma, HTN encephalopathy, infection, encephalitis, psychosis, intercranial 

hemorrhage, hepatic encephalopathy, meningitis, CVA, this is not meant to be an 

all-inclusive list





EKG interpreted by me (3pts min.).


@  -As above


X-rays interpreted by me (1pt min.).


@  -Chest x-ray as interpreted by myself reveals a patchy right-sided infiltra

deborah.  NG tube appears in satisfactory placement.  ET tube appears in 

satisfactory placement.


CT interpreted by me (1pt min.).


@  -Pending


U/S interpreted by me (1pt. min.).


@  -None done


What testing was considered but not performed or refused? (CT, X-rays, U/S, 

labs)? Why?


@  -None


What meds were considered but not given or refused? Why?


@  -None


Did you discuss the management of the patient with other professionals 

(professionals i.e. [facility] Asif, NP, lab, RT, psych nurse, , , 

teacher, , )? Give summary


@  -No


Was smoking cessation discussed for >3mins.?


@  -No


Was critical care preformed (if so, how long)?


@  -Yes, 35 minutes


Were there social determinants of health that impacted care today? How? 

(Homelessness, low income, unemployed, alcoholism, drug addiction, transp

ortation, low edu. Level, literacy, decrease access to med. care, penitentiary, rehab)?


@  -No


Was there de-escalation of care discussed even if they declined (Discuss DNR or 

withdrawal of care, Hospice)? DNR status


@  -No


What co-morbidities impacted this encounter? (DM, HTN, Smoking, COPD, CAD, 

Cancer, CVA, ARF, Chemo, Hep., AIDS, mental health diagnosis, sleep apnea, 

morbid obesity)?


@  -Polysubstance abuse


Was patient admitted / discharged? Hospital course, mention meds given and 

route, prescriptions, significant lab abnormalities, going to OR and other 

pertinent info.


@  -Patient presents status post rosc.  Unknown prior down time before EMS 

arrived, however 50 minute downtime and CPR for 15 minutes when they did arrive.

 Received 1 amp appendectomy, 1 amp of D50 and Narcan at the scene.  Serum 500 

mL IV fluid.  Patient intubated with confirmed placement of the ET tube.  

Patient placed on a ventilator after confirmation of ET tube in place.  Left 

more central line placed by myself without issue.  Patient started on a Levophed

drip as patient is hypotensive.  She received an additional 2 L of IV fluids 

started on a maintenance drip.  We will obtain CT brain, as well as CT chest and

pelvis as it is unclear why she rested.  Family/friends have not presents 

emergency department.





Patient signed out to Dr. Quinteros pending results of further workup and admission. 

Prognosis is poor.  Patient started on broad-spectrum antibiotics the patient 

does have the patchy infiltrate located in the right lung to cover for possible 

infection.





Undiagnosed new problem with uncertain prognosis?


@  -Yes


Drug Therapy requiring intensive monitoring for toxicity (Heparin, Nitro, 

Insulin, Cardizem)?


@  -No


Were any procedures done?


@  -Left more central line placement


Diagnosis/symptom?


@  -Cardiac arrest of unknown etiology s/p rosc, respiratory failure requiring 

intubation and mechanical ventilation, shock requiring vasopressor 

administration


Acute, or Chronic, or Acute on Chronic?


@  -Acute


Uncomplicated (without systemic symptoms) or Complicated (systemic symptoms)?


@  -Complicated


Side effects of treatment?


@  -No


Exacerbation, Progression, or Severe Exacerbation?


@  -No


Poses a threat to life or bodily function? How? (Chest pain, USA, MI, pneumonia,

PE, COPD, DKA, ARF, appy, cholecystitis, CVA, Diverticulitis, Homicidal, 

Suicidal, threat to staff... and all critical care pts)


@  -Yes (Acosta Coffey)





X-rays interpreted by me (1pt min.).


@  -None done


CT interpreted by me (1pt min.).


@  -CT of the brain was performed by myself.  CT of the brain showed diffuse 

axonal injury consistent with anoxic injury CT of the chest abdomen pelvis show 

a rib fracture and an infiltrate possible aspiration pneumonia.


U/S interpreted by me (1pt. min.).


@  -None done


What testing was considered but not performed or refused? (CT, X-rays, U/S, 

labs)? Why?


@  -None


What meds were considered but not given or refused? Why?


@  -None


Did you discuss the management of the patient with other professionals (

professionals i.e. , PA, NP, lab, RT, psych nurse, , , 

teacher, , )? Give summary


@  -Dr. Vann he agreed to admit the patient.  I spoke with Dr. Holley he 

agreed to accept the patient to the ICU when they get beds


Was smoking cessation discussed for >3mins.?


@  -No


Was critical care preformed (if so, how long)?


@  -35 minutes


Were there social determinants of health that impacted care today? How? 

(Homelessness, low income, unemployed, alcoholism, drug addiction, 

transportation, low edu. Level, literacy, decrease access to med. care, penitentiary, 

rehab)?


@  -No


Was there de-escalation of care discussed even if they declined (Discuss DNR or 

withdrawal of care, Hospice)? DNR status


@  -No


What co-morbidities impacted this encounter? (DM, HTN, Smoking, COPD, CAD, 

Cancer, CVA, ARF, Chemo, Hep., AIDS, mental health diagnosis, sleep apnea, 

morbid obesity)?


@  -None


Was patient admitted / discharged? Hospital course, mention meds given and 

route, prescriptions, significant lab abnormalities, going to OR and other 

pertinent info.


@  -Patient was alert and Levophed when I arrived this morning patient was given

Vanco prior to my arrival I gave the patient cefepime for the patient had 

pneumonia.  I spoke with Dr. Holley and Dr. Mullally he agreed to admit the 

patient wrote admitting orders I consulted cardiology as well.  Patient was also

given 2 A of bicarb patient was put on a warmer because her body temperature was

83.


Undiagnosed new problem with uncertain prognosis?


@  -No


Drug Therapy requiring intensive monitoring for toxicity (Heparin, Nitro, 

Insulin, Cardizem)?


@  -No


Were any procedures done?


@  -No


Diagnosis/symptom?


@  -Cardiac arrest


Acute, or Chronic, or Acute on Chronic?


@  -Acute


Uncomplicated (without systemic symptoms) or Complicated (systemic symptoms)?


@  -Complicated


Side effects of treatment?


@  -No


Exacerbation, Progression, or Severe Exacerbation?


@  -No


Poses a threat to life or bodily function? How? (Chest pain, USA, MI, pneumonia,

PE, COPD, DKA, ARF, appy, cholecystitis, CVA, Diverticulitis, Homicidal, 

Suicidal, threat to staff... and all critical care pts)


@  -Yes cardiac arrest can cause end organ dysfunction


Diagnosis/symptom?


@  -Pneumonia


Acute, or Chronic, or Acute on Chronic?


@  -Acute


Uncomplicated (without systemic symptoms) or Complicated (systemic symptoms)?


@  -Complicated


Side effects of treatment?


@  -none


Exacerbation, Progression, or Severe Exacerbation]


@  -no


Poses a threat to life or bodily function?


@  -Yes this with sepsis and end organ dysfunction


Diagnosis/symptom?


@  -Diffuse axonal injury


Acute, or Chronic, or Acute on Chronic?


@  -Acute


Uncomplicated (without systemic symptoms) or Complicated (systemic symptoms)?


@  -Complicated


Side effects of treatment?


@  -none


Exacerbation, Progression, or Severe Exacerbation]


@  -no


Poses a threat to life or bodily function?


@  -This can lead brain death and death


Diagnosis/symptom?


@  -Sepsis


Acute, or Chronic, or Acute on Chronic?


@  -Acute


Uncomplicated (without systemic symptoms) or Complicated (systemic symptoms)?


@  -Complicated


Side effects of treatment?


@  -none


Exacerbation, Progression, or Severe Exacerbation]


@  -no


Poses a threat to life or bodily function?


@  -Yes asleep poor perfusion and end organ dysfunction (Kevon Quinteros)





- Lab Data


                                   Lab Results











  04/21/23 04/21/23 04/21/23 Range/Units





  06:45 06:45 06:45 


 


WBC  20.4 H    (3.8-10.6)  k/uL


 


RBC  4.04    (3.80-5.40)  m/uL


 


Hgb  12.4    (11.4-16.0)  gm/dL


 


Hct  43.5    (34.0-46.0)  %


 


MCV  107.7 H D    (80.0-100.0)  fL


 


MCH  30.6    (25.0-35.0)  pg


 


MCHC  28.4 L    (31.0-37.0)  g/dL


 


RDW  13.6    (11.5-15.5)  %


 


Plt Count      (150-450)  k/uL


 


MPV  9.2    


 


Neutrophils %  76    %


 


Lymphocytes %  15    %


 


Monocytes %  7    %


 


Eosinophils %  1    %


 


Basophils %  1    %


 


Neutrophils #  15.4 H    (1.3-7.7)  k/uL


 


Lymphocytes #  3.1    (1.0-4.8)  k/uL


 


Monocytes #  1.4 H    (0-1.0)  k/uL


 


Eosinophils #  0.1    (0-0.7)  k/uL


 


Basophils #  0.1    (0-0.2)  k/uL


 


Manual Slide Review  Performed    


 


Large Platelets  Present    


 


Polychromasia  Present    


 


Hypochromasia  Marked    


 


Macrocytosis  Moderate    


 


PT   16.8 H   (9.0-12.0)  sec


 


INR   1.7 H   (<1.2)  


 


APTT   46.5 H   (22.0-30.0)  sec


 


Sample Site     


 


ABG pH     (7.35-7.45)  


 


ABG pCO2     (35-45)  mmHg


 


ABG pO2     ()  mmHg


 


ABG HCO3     (21-25)  mmol/L


 


ABG Total CO2     (19-24)  mmol/L


 


ABG O2 Saturation     (94-97)  %


 


ABG Base Excess     mmol/L


 


Carl Test     


 


FiO2     %


 


Sodium    140  (137-145)  mmol/L


 


Potassium    6.3 H*  (3.5-5.1)  mmol/L


 


Chloride    101  ()  mmol/L


 


Carbon Dioxide    9 L*  (22-30)  mmol/L


 


Anion Gap    30  mmol/L


 


BUN    23 H  (7-17)  mg/dL


 


Creatinine    2.61 H  (0.52-1.04)  mg/dL


 


Est GFR (CKD-EPI)AfAm    25  (>60 ml/min/1.73 sqM)  


 


Est GFR (CKD-EPI)NonAf    21  (>60 ml/min/1.73 sqM)  


 


Glucose    187 H  (74-99)  mg/dL


 


POC Glucose (mg/dL)     ()  mg/dL


 


POC Glu Operater ID     


 


Lactic Ac Sepsis Rflx     


 


Plasma Lactic Acid Brent     (0.7-2.0)  mmol/L


 


Calcium    8.8  (8.4-10.2)  mg/dL


 


Total Bilirubin    0.8  (0.2-1.3)  mg/dL


 


AST    3413 H  (14-36)  U/L


 


ALT    2679 H  (4-34)  U/L


 


Alkaline Phosphatase    76  ()  U/L


 


Ammonia     (<30)  umol/L


 


Troponin I     (0.000-0.034)  ng/mL


 


NT-Pro-B Natriuret Pep     pg/mL


 


Total Protein    5.7 L  (6.3-8.2)  g/dL


 


Albumin    3.3 L  (3.5-5.0)  g/dL


 


Urine Color     


 


Urine Appearance     (Clear)  


 


Urine pH     (5.0-8.0)  


 


Ur Specific Gravity     (1.001-1.035)  


 


Urine Protein     (Negative)  


 


Urine Glucose (UA)     (Negative)  


 


Urine Ketones     (Negative)  


 


Urine Blood     (Negative)  


 


Urine Nitrite     (Negative)  


 


Urine Bilirubin     (Negative)  


 


Urine Urobilinogen     (<2.0)  mg/dL


 


Ur Leukocyte Esterase     (Negative)  


 


Urine RBC     (0-5)  /hpf


 


Urine WBC     (0-5)  /hpf


 


Ur Squamous Epith Cells     (0-4)  /hpf


 


Urine Bacteria     (None)  /hpf


 


Hyaline Casts     (0-2)  /lpf


 


Urine Mucus     (None)  /hpf


 


Urine Opiates Screen     (NotDetected)  


 


Ur Oxycodone Screen     (NotDetected)  


 


Urine Methadone Screen     (NotDetected)  


 


Ur Propoxyphene Screen     (NotDetected)  


 


Ur Barbiturates Screen     (NotDetected)  


 


U Tricyclic Antidepress     (NotDetected)  


 


Ur Phencyclidine Scrn     (NotDetected)  


 


Ur Amphetamines Screen     (NotDetected)  


 


U Methamphetamines Scrn     (NotDetected)  


 


U Benzodiazepines Scrn     (NotDetected)  


 


Urine Cocaine Screen     (NotDetected)  


 


U Marijuana (THC) Screen     (NotDetected)  


 


Serum Alcohol    11  mg/dL


 


Influenza Type A (PCR)     (Not Detectd)  


 


Influenza Type B (PCR)     (Not Detectd)  


 


RSV (PCR)     (Not Detectd)  


 


SARS-CoV-2 (PCR)     (Not Detectd)  














  04/21/23 04/21/23 04/21/23 Range/Units





  06:45 06:45 06:45 


 


WBC     (3.8-10.6)  k/uL


 


RBC     (3.80-5.40)  m/uL


 


Hgb     (11.4-16.0)  gm/dL


 


Hct     (34.0-46.0)  %


 


MCV     (80.0-100.0)  fL


 


MCH     (25.0-35.0)  pg


 


MCHC     (31.0-37.0)  g/dL


 


RDW     (11.5-15.5)  %


 


Plt Count     (150-450)  k/uL


 


MPV     


 


Neutrophils %     %


 


Lymphocytes %     %


 


Monocytes %     %


 


Eosinophils %     %


 


Basophils %     %


 


Neutrophils #     (1.3-7.7)  k/uL


 


Lymphocytes #     (1.0-4.8)  k/uL


 


Monocytes #     (0-1.0)  k/uL


 


Eosinophils #     (0-0.7)  k/uL


 


Basophils #     (0-0.2)  k/uL


 


Manual Slide Review     


 


Large Platelets     


 


Polychromasia     


 


Hypochromasia     


 


Macrocytosis     


 


PT     (9.0-12.0)  sec


 


INR     (<1.2)  


 


APTT     (22.0-30.0)  sec


 


Sample Site     


 


ABG pH     (7.35-7.45)  


 


ABG pCO2     (35-45)  mmHg


 


ABG pO2     ()  mmHg


 


ABG HCO3     (21-25)  mmol/L


 


ABG Total CO2     (19-24)  mmol/L


 


ABG O2 Saturation     (94-97)  %


 


ABG Base Excess     mmol/L


 


Carl Test     


 


FiO2     %


 


Sodium     (137-145)  mmol/L


 


Potassium     (3.5-5.1)  mmol/L


 


Chloride     ()  mmol/L


 


Carbon Dioxide     (22-30)  mmol/L


 


Anion Gap     mmol/L


 


BUN     (7-17)  mg/dL


 


Creatinine     (0.52-1.04)  mg/dL


 


Est GFR (CKD-EPI)AfAm     (>60 ml/min/1.73 sqM)  


 


Est GFR (CKD-EPI)NonAf     (>60 ml/min/1.73 sqM)  


 


Glucose     (74-99)  mg/dL


 


POC Glucose (mg/dL)     ()  mg/dL


 


POC Glu Operater ID     


 


Lactic Ac Sepsis Rflx     


 


Plasma Lactic Acid Brent   20.4 H*   (0.7-2.0)  mmol/L


 


Calcium     (8.4-10.2)  mg/dL


 


Total Bilirubin     (0.2-1.3)  mg/dL


 


AST     (14-36)  U/L


 


ALT     (4-34)  U/L


 


Alkaline Phosphatase     ()  U/L


 


Ammonia   447 H   (<30)  umol/L


 


Troponin I  0.175 H*    (0.000-0.034)  ng/mL


 


NT-Pro-B Natriuret Pep     pg/mL


 


Total Protein     (6.3-8.2)  g/dL


 


Albumin     (3.5-5.0)  g/dL


 


Urine Color     


 


Urine Appearance     (Clear)  


 


Urine pH     (5.0-8.0)  


 


Ur Specific Gravity     (1.001-1.035)  


 


Urine Protein     (Negative)  


 


Urine Glucose (UA)     (Negative)  


 


Urine Ketones     (Negative)  


 


Urine Blood     (Negative)  


 


Urine Nitrite     (Negative)  


 


Urine Bilirubin     (Negative)  


 


Urine Urobilinogen     (<2.0)  mg/dL


 


Ur Leukocyte Esterase     (Negative)  


 


Urine RBC     (0-5)  /hpf


 


Urine WBC     (0-5)  /hpf


 


Ur Squamous Epith Cells     (0-4)  /hpf


 


Urine Bacteria     (None)  /hpf


 


Hyaline Casts     (0-2)  /lpf


 


Urine Mucus     (None)  /hpf


 


Urine Opiates Screen     (NotDetected)  


 


Ur Oxycodone Screen     (NotDetected)  


 


Urine Methadone Screen     (NotDetected)  


 


Ur Propoxyphene Screen     (NotDetected)  


 


Ur Barbiturates Screen     (NotDetected)  


 


U Tricyclic Antidepress     (NotDetected)  


 


Ur Phencyclidine Scrn     (NotDetected)  


 


Ur Amphetamines Screen     (NotDetected)  


 


U Methamphetamines Scrn     (NotDetected)  


 


U Benzodiazepines Scrn     (NotDetected)  


 


Urine Cocaine Screen     (NotDetected)  


 


U Marijuana (THC) Screen     (NotDetected)  


 


Serum Alcohol     mg/dL


 


Influenza Type A (PCR)    Not Detected  (Not Detectd)  


 


Influenza Type B (PCR)    Not Detected  (Not Detectd)  


 


RSV (PCR)    Not Detected  (Not Detectd)  


 


SARS-CoV-2 (PCR)    Not Detected  (Not Detectd)  














  04/21/23 04/21/23 04/21/23 Range/Units





  06:45 06:50 07:00 


 


WBC     (3.8-10.6)  k/uL


 


RBC     (3.80-5.40)  m/uL


 


Hgb     (11.4-16.0)  gm/dL


 


Hct     (34.0-46.0)  %


 


MCV     (80.0-100.0)  fL


 


MCH     (25.0-35.0)  pg


 


MCHC     (31.0-37.0)  g/dL


 


RDW     (11.5-15.5)  %


 


Plt Count     (150-450)  k/uL


 


MPV     


 


Neutrophils %     %


 


Lymphocytes %     %


 


Monocytes %     %


 


Eosinophils %     %


 


Basophils %     %


 


Neutrophils #     (1.3-7.7)  k/uL


 


Lymphocytes #     (1.0-4.8)  k/uL


 


Monocytes #     (0-1.0)  k/uL


 


Eosinophils #     (0-0.7)  k/uL


 


Basophils #     (0-0.2)  k/uL


 


Manual Slide Review     


 


Large Platelets     


 


Polychromasia     


 


Hypochromasia     


 


Macrocytosis     


 


PT     (9.0-12.0)  sec


 


INR     (<1.2)  


 


APTT     (22.0-30.0)  sec


 


Sample Site     


 


ABG pH     (7.35-7.45)  


 


ABG pCO2     (35-45)  mmHg


 


ABG pO2     ()  mmHg


 


ABG HCO3     (21-25)  mmol/L


 


ABG Total CO2     (19-24)  mmol/L


 


ABG O2 Saturation     (94-97)  %


 


ABG Base Excess     mmol/L


 


Carl Test     


 


FiO2     %


 


Sodium     (137-145)  mmol/L


 


Potassium     (3.5-5.1)  mmol/L


 


Chloride     ()  mmol/L


 


Carbon Dioxide     (22-30)  mmol/L


 


Anion Gap     mmol/L


 


BUN     (7-17)  mg/dL


 


Creatinine     (0.52-1.04)  mg/dL


 


Est GFR (CKD-EPI)AfAm     (>60 ml/min/1.73 sqM)  


 


Est GFR (CKD-EPI)NonAf     (>60 ml/min/1.73 sqM)  


 


Glucose     (74-99)  mg/dL


 


POC Glucose (mg/dL)   193 H   ()  mg/dL


 


POC Glu Operater ID   Tesha Patel   


 


Lactic Ac Sepsis Rflx     


 


Plasma Lactic Acid Brent     (0.7-2.0)  mmol/L


 


Calcium     (8.4-10.2)  mg/dL


 


Total Bilirubin     (0.2-1.3)  mg/dL


 


AST     (14-36)  U/L


 


ALT     (4-34)  U/L


 


Alkaline Phosphatase     ()  U/L


 


Ammonia     (<30)  umol/L


 


Troponin I     (0.000-0.034)  ng/mL


 


NT-Pro-B Natriuret Pep  179    pg/mL


 


Total Protein     (6.3-8.2)  g/dL


 


Albumin     (3.5-5.0)  g/dL


 


Urine Color    Yellow  


 


Urine Appearance    Cloudy H  (Clear)  


 


Urine pH    5.5  (5.0-8.0)  


 


Ur Specific Gravity    1.012  (1.001-1.035)  


 


Urine Protein    2+ H  (Negative)  


 


Urine Glucose (UA)    Trace H  (Negative)  


 


Urine Ketones    Negative  (Negative)  


 


Urine Blood    Trace H  (Negative)  


 


Urine Nitrite    Negative  (Negative)  


 


Urine Bilirubin    Negative  (Negative)  


 


Urine Urobilinogen    <2.0  (<2.0)  mg/dL


 


Ur Leukocyte Esterase    Negative  (Negative)  


 


Urine RBC    1  (0-5)  /hpf


 


Urine WBC    2  (0-5)  /hpf


 


Ur Squamous Epith Cells    4  (0-4)  /hpf


 


Urine Bacteria    Rare H  (None)  /hpf


 


Hyaline Casts    5 H  (0-2)  /lpf


 


Urine Mucus    Rare H  (None)  /hpf


 


Urine Opiates Screen     (NotDetected)  


 


Ur Oxycodone Screen     (NotDetected)  


 


Urine Methadone Screen     (NotDetected)  


 


Ur Propoxyphene Screen     (NotDetected)  


 


Ur Barbiturates Screen     (NotDetected)  


 


U Tricyclic Antidepress     (NotDetected)  


 


Ur Phencyclidine Scrn     (NotDetected)  


 


Ur Amphetamines Screen     (NotDetected)  


 


U Methamphetamines Scrn     (NotDetected)  


 


U Benzodiazepines Scrn     (NotDetected)  


 


Urine Cocaine Screen     (NotDetected)  


 


U Marijuana (THC) Screen     (NotDetected)  


 


Serum Alcohol     mg/dL


 


Influenza Type A (PCR)     (Not Detectd)  


 


Influenza Type B (PCR)     (Not Detectd)  


 


RSV (PCR)     (Not Detectd)  


 


SARS-CoV-2 (PCR)     (Not Detectd)  














  04/21/23 04/21/23 04/21/23 Range/Units





  07:00 07:20 07:46 


 


WBC     (3.8-10.6)  k/uL


 


RBC     (3.80-5.40)  m/uL


 


Hgb     (11.4-16.0)  gm/dL


 


Hct     (34.0-46.0)  %


 


MCV     (80.0-100.0)  fL


 


MCH     (25.0-35.0)  pg


 


MCHC     (31.0-37.0)  g/dL


 


RDW     (11.5-15.5)  %


 


Plt Count     (150-450)  k/uL


 


MPV     


 


Neutrophils %     %


 


Lymphocytes %     %


 


Monocytes %     %


 


Eosinophils %     %


 


Basophils %     %


 


Neutrophils #     (1.3-7.7)  k/uL


 


Lymphocytes #     (1.0-4.8)  k/uL


 


Monocytes #     (0-1.0)  k/uL


 


Eosinophils #     (0-0.7)  k/uL


 


Basophils #     (0-0.2)  k/uL


 


Manual Slide Review     


 


Large Platelets     


 


Polychromasia     


 


Hypochromasia     


 


Macrocytosis     


 


PT     (9.0-12.0)  sec


 


INR     (<1.2)  


 


APTT     (22.0-30.0)  sec


 


Sample Site   l rad   


 


ABG pH   <6.80 L*   (7.35-7.45)  


 


ABG pCO2   59 H   (35-45)  mmHg


 


ABG pO2   >400 H   ()  mmHg


 


ABG HCO3   8 L*   (21-25)  mmol/L


 


ABG Total CO2   10 L   (19-24)  mmol/L


 


ABG O2 Saturation   99.9 H   (94-97)  %


 


ABG Base Excess   -26.7   mmol/L


 


Carl Test   Yes   


 


FiO2   100   %


 


Sodium     (137-145)  mmol/L


 


Potassium     (3.5-5.1)  mmol/L


 


Chloride     ()  mmol/L


 


Carbon Dioxide     (22-30)  mmol/L


 


Anion Gap     mmol/L


 


BUN     (7-17)  mg/dL


 


Creatinine     (0.52-1.04)  mg/dL


 


Est GFR (CKD-EPI)AfAm     (>60 ml/min/1.73 sqM)  


 


Est GFR (CKD-EPI)NonAf     (>60 ml/min/1.73 sqM)  


 


Glucose     (74-99)  mg/dL


 


POC Glucose (mg/dL)     ()  mg/dL


 


POC Glu Operater ID     


 


Lactic Ac Sepsis Rflx    Y  


 


Plasma Lactic Acid Brent     (0.7-2.0)  mmol/L


 


Calcium     (8.4-10.2)  mg/dL


 


Total Bilirubin     (0.2-1.3)  mg/dL


 


AST     (14-36)  U/L


 


ALT     (4-34)  U/L


 


Alkaline Phosphatase     ()  U/L


 


Ammonia     (<30)  umol/L


 


Troponin I     (0.000-0.034)  ng/mL


 


NT-Pro-B Natriuret Pep     pg/mL


 


Total Protein     (6.3-8.2)  g/dL


 


Albumin     (3.5-5.0)  g/dL


 


Urine Color     


 


Urine Appearance     (Clear)  


 


Urine pH     (5.0-8.0)  


 


Ur Specific Gravity     (1.001-1.035)  


 


Urine Protein     (Negative)  


 


Urine Glucose (UA)     (Negative)  


 


Urine Ketones     (Negative)  


 


Urine Blood     (Negative)  


 


Urine Nitrite     (Negative)  


 


Urine Bilirubin     (Negative)  


 


Urine Urobilinogen     (<2.0)  mg/dL


 


Ur Leukocyte Esterase     (Negative)  


 


Urine RBC     (0-5)  /hpf


 


Urine WBC     (0-5)  /hpf


 


Ur Squamous Epith Cells     (0-4)  /hpf


 


Urine Bacteria     (None)  /hpf


 


Hyaline Casts     (0-2)  /lpf


 


Urine Mucus     (None)  /hpf


 


Urine Opiates Screen  Not Detected    (NotDetected)  


 


Ur Oxycodone Screen  Not Detected    (NotDetected)  


 


Urine Methadone Screen  Not Detected    (NotDetected)  


 


Ur Propoxyphene Screen  Not Detected    (NotDetected)  


 


Ur Barbiturates Screen  Not Detected    (NotDetected)  


 


U Tricyclic Antidepress  Not Detected    (NotDetected)  


 


Ur Phencyclidine Scrn  Not Detected    (NotDetected)  


 


Ur Amphetamines Screen  Detected H    (NotDetected)  


 


U Methamphetamines Scrn  Detected H    (NotDetected)  


 


U Benzodiazepines Scrn  Not Detected    (NotDetected)  


 


Urine Cocaine Screen  Detected H    (NotDetected)  


 


U Marijuana (THC) Screen  Detected H    (NotDetected)  


 


Serum Alcohol     mg/dL


 


Influenza Type A (PCR)     (Not Detectd)  


 


Influenza Type B (PCR)     (Not Detectd)  


 


RSV (PCR)     (Not Detectd)  


 


SARS-CoV-2 (PCR)     (Not Detectd)  














- EKG Data


EKG Comments: 





12-lead Electrocardiogram Interpretation Note





EKG was reviewed and interpreted by myself. 12-lead ECG performed at 0630 is 

interpreted by me as revealing sinus bradycardia at a rate of 48 beats per 

minute.  Axis is normal.  There were no ST or T wave abnormalities to suggest 

myocardial ischemia or injury. R wave progression across the precordium was 

satisfactory. By my interpretation this EKG is non-diagnostic for acute ischem

ia. (Acosta Coffey)





Critical Care Time


Critical Care Time: Yes


Total Critical Care Time: 35





<Acosta Coffey - Last Filed: 04/21/23 08:54>





Disposition





<Acosta Coffey - Last Filed: 04/21/23 08:54>


Time of Disposition: 10:15





<Kevon Quinteros - Last Filed: 04/21/23 10:15>


Clinical Impression: 


 Shock, Cardiac arrest, Acute respiratory failure, Pneumonia, Diffuse axonal 

injury, Polysubstance abuse





Disposition: ADMITTED AS IP TO THIS HOSP


Referrals: 


None,Stated [Primary Care Provider] - 1-2 days

## 2023-04-21 NOTE — P.CRDCN
History of Present Illness


Consult date: 04/21/23


History of present illness: 





History of Present Illness:


The patient is a 47-year-old female with known history of multi-substance abuse 

who presented with cardiac arrest, underwent CPR by EMS for 15 minutes, received

epinephrine and Narcan with return of spontaneous rhythm.  She is intubated.  

She is in sinus mechanism she had severe lactic acidosis and evidence of shock 

liver.  The duration that the patient was unresponsive is unclear.  Apparently 

she was not seen for about 3 hours.  She was in sinus mechanism with sinus 

bradycardia on admission with prolonged QT interval, severely acidotic.  Her 

computed tomography scan of the head showed diffuse loss of gray-white matter 

differentiation consistent with anoxic injury.  Her lab data showed a troponin 

of 0.259, pH less than 6.8.  Her urine screen was positive for amphetamine, 

cocaine and marijuana.  She had evidence of acute renal injury and hyperkalemia.


Medications: Not available





Review of Systems:


Could not be obtained





Physical Examination: 


47-year-old female, intubated ,Blood pressure 94/57, Heart rate 84, afebrile


Head: Normocephalic.


Eyes: Sclerae nonicteric.  Pupils fixed


Neck: Good carotid upstroke, no bruit, no jugular venous distention.


Lungs: Clear to auscultation.


Heart: Regular rate and rhythm, S1-S2, no S3, no rub. No murmur.


Abdomen: Soft, positive bowel sounds no organomegaly.


Extremities: No edema, intact distal pulses.





Labs: 


WBC 20.4, potassium 6.3, BUN 23, creatinine 2.61, plasma lactic acid 20.4.  

Troponin 0.175, 0.259.  AST 3413, ALT 2679.  Chest x-ray with right perihilar 

infiltrate


EKG:


Sinus bradycardia rate of 48 with early repolarization and prolonged QT interval


Impression:


1.  Cardiac arrest was multi-substance use probable overdose


2.  Severe anoxic encephalopathy


3.  Mild troponin elevation, type II event


4.  Multiorgan failure with shock liver and acute renal injury





Plan:


1.  Continue supportive care


2.  Obtain an echocardiogram with Doppler


3.  Follow renal functions


4.  Prognosis is poor in view of her lab data and the down time


5.  Thank you for this consult we will follow





Past Medical History


Past Medical History: No Reported History


Additional Past Medical History / Comment(s): History of depression, history of 

cannabis abuse


History of Any Multi-Drug Resistant Organisms: None Reported


Past Surgical History: Tubal Ligation


Additional Past Surgical History / Comment(s): LEFT LEG R/T TRAUMA, BLADDER 

SURGERY


Past Anesthesia/Blood Transfusion Reactions: No Reported Reaction


Past Psychological History: Bipolar, Depression, PTSD


Smoking Status: Current every day smoker


Past Alcohol Use History: Daily


Past Drug Use History: Marijuana





- Past Family History


  ** Mother


Family Medical History: COPD





Medications and Allergies


                                Home Medications











 Medication  Instructions  Recorded  Confirmed  Type


 


No Known Home Medications  06/03/22 04/21/23 History








                                    Allergies











Allergy/AdvReac Type Severity Reaction Status Date / Time


 


No Known Allergies Allergy   Verified 04/21/23 07:08














Physical Exam


Vitals: 


                                   Vital Signs











  Temp Pulse Resp BP BP Pulse Ox FiO2


 


 04/21/23 12:30  96.1 F L  84  16  94/57   93 L 


 


 04/21/23 12:13        80


 


 04/21/23 12:12        80


 


 04/21/23 12:00  94.5 F L  85  30 H  101/45   89 L 


 


 04/21/23 11:45  94.3 F L  77  16  108/54   88 L 


 


 04/21/23 11:26        50


 


 04/21/23 11:00  90.3 F L  64  20  122/68   100 


 


 04/21/23 10:00  86.5 F L  53 L  16  116/67   100 


 


 04/21/23 09:30  85.1 F L  46 L  16  125/77   100 


 


 04/21/23 09:00  83.8 F L  57 L  16  144/85   100 


 


 04/21/23 08:45     150/92   


 


 04/21/23 08:35  83.5 F L  55 L  16  142/89   100 


 


 04/21/23 08:33        100


 


 04/21/23 08:09  83.5 F L  56 L  16  124/79   100 


 


 04/21/23 08:00  82.9 F L  51 L  16  118/71   100 


 


 04/21/23 07:50  83.1 F L  52 L  16  104/64   


 


 04/21/23 07:42  83.1 F L  52 L  16  84/49   100 


 


 04/21/23 07:35   53 L  18  85/51   100 


 


 04/21/23 07:29     73/43   


 


 04/21/23 07:20   52 L  16  70/43   100 


 


 04/21/23 07:19   52 L  18  67/41   100 


 


 04/21/23 07:08      52/32  


 


 04/21/23 06:50      59/35  


 


 04/21/23 06:45      60/27  


 


 04/21/23 06:40      42/29  


 


 04/21/23 06:28        100








                                Intake and Output











 04/20/23 04/21/23 04/21/23





 22:59 06:59 14:59


 


Intake Total  3.499 10.291


 


Balance  3.499 10.291


 


Intake:   


 


  Intake, IV Titration  3.499 10.291





  Amount   


 


    Norepinephrine 32 mg In   10.291





    Sodium Chloride 0.9% 218   





    ml @ 0.03 MCG/KG/MIN 0.   





    861 mls/hr IV .Q24H ONE   





    Rx#:[id number]   


 


    Norepinephrine 4 mg In  3.499 





    Sodium Chloride 0.9% 250   





    ml @ 0.03 MCG/KG/MIN 6.   





    999 mls/hr IV .Q24H ONE   





    Rx#:[id number]   


 


Other:   


 


  Weight  61.235 kg 














Results





                                 04/21/23 06:45





                                 04/21/23 06:45


                                 Cardiac Enzymes











  04/21/23 04/21/23 04/21/23 Range/Units





  06:45 06:45 10:26 


 


AST  3413 H    (14-36)  U/L


 


Troponin I   0.175 H*  0.259 H*  (0.000-0.034)  ng/mL








                                   Coagulation











  04/21/23 Range/Units





  06:45 


 


PT  16.8 H  (9.0-12.0)  sec


 


APTT  46.5 H  (22.0-30.0)  sec








                                       CBC











  04/21/23 Range/Units





  06:45 


 


WBC  20.4 H  (3.8-10.6)  k/uL


 


RBC  4.04  (3.80-5.40)  m/uL


 


Hgb  12.4  (11.4-16.0)  gm/dL


 


Hct  43.5  (34.0-46.0)  %


 


Plt Count    (150-450)  k/uL








                          Comprehensive Metabolic Panel











  04/21/23 Range/Units





  06:45 


 


Sodium  140  (137-145)  mmol/L


 


Potassium  6.3 H*  (3.5-5.1)  mmol/L


 


Chloride  101  ()  mmol/L


 


Carbon Dioxide  9 L*  (22-30)  mmol/L


 


BUN  23 H  (7-17)  mg/dL


 


Creatinine  2.61 H  (0.52-1.04)  mg/dL


 


Glucose  187 H  (74-99)  mg/dL


 


Calcium  8.8  (8.4-10.2)  mg/dL


 


AST  3413 H  (14-36)  U/L


 


ALT  2679 H  (4-34)  U/L


 


Alkaline Phosphatase  76  ()  U/L


 


Total Protein  5.7 L  (6.3-8.2)  g/dL


 


Albumin  3.3 L  (3.5-5.0)  g/dL








                               Current Medications











Generic Name Dose Route Start Last Admin





  Trade Name Freq  PRN Reason Stop Dose Admin


 


Sodium Chloride  1,000 mls @ 130 mls/hr  04/21/23 06:28  04/21/23 06:42





  Saline 0.9%  IV  04/21/23 14:09  130 mls/hr





  .Q7H42M STA   Administration


 


Norepinephrine Bitartrate 32  250 mls @ 0.861 mls/hr  04/21/23 06:40  04/21/23 

09:02





  mg/ Sodium Chloride  IV  04/22/23 06:39  0.16 mcg/kg/min





  .Q24H ONE   4.593 mls/hr





    Titration





  Protocol  





  0.03 MCG/KG/MIN  


 


Norepinephrine Bitartrate 4 mg  254 mls @ 6.999 mls/hr  04/21/23 06:56  04/21/23

06:50





  / Sodium Chloride  IV  04/22/23 06:55  0.07 mcg/kg/min





  .Q24H ONE   16.331 mls/hr





    Titration





  Protocol  





  0.03 MCG/KG/MIN  


 


Vancomycin HCl 1,250 mg/  250 mls @ 125 mls/hr  04/22/23 06:00 





  Sodium Chloride  IVPB  04/22/23 07:59 





  ONCE ONE  


 


Cefepime HCl 1 gm/ Sodium  50 mls @ 12.5 mls/hr  04/21/23 21:00 





  Chloride  IVPB  





  Q12HR [facility]  


 


Miscellaneous Information  1 each  04/21/23 07:48 





  Vancomycin Iv Per Pharmacy 1 Each Misc  MISCELLANE  





  AS DIRECTED PRN  





  Per Protocol  





  Protocol  


 


Naloxone HCl  0.2 mg  04/21/23 10:15 





  Naloxone 0.4 Mg/Ml 1 Ml Vial  IV  





  Q2M PRN  





  Opioid Reversal  








                                Intake and Output











 04/20/23 04/21/23 04/21/23





 22:59 06:59 14:59


 


Intake Total  3.499 10.291


 


Balance  3.499 10.291


 


Intake:   


 


  Intake, IV Titration  3.499 10.291





  Amount   


 


    Norepinephrine 32 mg In   10.291





    Sodium Chloride 0.9% 218   





    ml @ 0.03 MCG/KG/MIN 0.   





    861 mls/hr IV .Q24H ONE   





    Rx#:[id number]   


 


    Norepinephrine 4 mg In  3.499 





    Sodium Chloride 0.9% 250   





    ml @ 0.03 MCG/KG/MIN 6.   





    999 mls/hr IV .Q24H ONE   





    Rx#:[id number]   


 


Other:   


 


  Weight  61.235 kg 








                                        





                                 04/21/23 06:45 





                                 04/21/23 06:45

## 2023-04-22 VITALS — SYSTOLIC BLOOD PRESSURE: 100 MMHG | HEART RATE: 98 BPM | RESPIRATION RATE: 19 BRPM | DIASTOLIC BLOOD PRESSURE: 78 MMHG

## 2023-04-22 RX ADMIN — PIPERACILLIN AND TAZOBACTAM SCH: 3; .375 INJECTION, POWDER, FOR SOLUTION INTRAVENOUS at 00:39

## 2023-04-22 NOTE — HP
HISTORY AND PHYSICAL



HISTORY OF PRESENT ILLNESS:

A 47-year-old female came in for cardiac arrest, someone woke her up, she was sleeping

in the couch _____ 6 a.m., she was not responsive.  No CPR.  She was pulseless.  CPR

was started.  Decompression was given 33 minutes at the scene, she was given

norepinephrine, 1 amp of D50, Narcan, 33 minutes of CPR total, _____ multiple CTs

showed infiltrates due to aspiration in the lung.  CT shows anoxic encephalopathy of

the brain.  .  Troponin 0.17 and 0.25.  She is on norepinephrine for

hypotension.  ABGs reviewed.  Hemoglobin 12.4, white count 20.4.



PAST MEDICAL AND PAST SURGICAL HISTORY:

Cannabis, tubal ligation, left leg trauma, bladder surgery, history of bipolar,

depression, PTSD.



SOCIAL HISTORY:

Current everyday smoker of marijuana.



FAMILY HISTORY:

COPD.



MEDICATIONS:

None.



ALLERGIES:

None at home.



PHYSICAL EXAMINATION:

VITAL SIGNS:  Reviewed with blood pressures, she is still hypotensive, norepinephrine

is given around low 100s/60s.  Pulse 60s, respiratory rate 18 to 20, saturating 100% on

oxygen.

CARDIAC:  Regular rhythm.

ABDOMEN:  Soft.

EXTREMITIES:  No cyanosis, clubbing, or edema.

HEENT:  Normocephalic, atraumatic.

NECK:  Carotids okay.  No adenopathy.

_____

NEUROLOGIC:  Pupils are 3 mm, sluggish.  No gag reflex.  She is able to breathe with

mechanical ventilators, triggering the ventilator right now.  Motor function is absent.

Sensors cannot be obtained.  Reflexes are diminished.



LABORATORY DATA:

Labs were reviewed.



ASSESSMENT AND PLAN:

Cardiac arrest, _____ unknown, shock hypotension post cardiac arrest, currently on

pressors, severe anoxic encephalopathy, hypothermia, severe lactic acidosis, bilateral

pulmonary infiltrates, aspiration, polysubstance abuse with amphetamines, cocaine,

marijuana.  Troponin leak secondary to cardiac arrest, gastroesophageal reflux disease

precautions.  IV antibiotics have been given, outcome is poor.  Get Neurology consult

for anoxic brain injury.  Please see further orders.





MMODL / IJN: [id number] / Job#: [id number]

## 2023-04-22 NOTE — P.CNNES
History of Present Illness


Consult date: 04/21/23


Requesting physician: Maylin Holley


Reason for Consult: cardiac arrest


History of Present Illness: 





Patient is a 47-year-old female who came to the ER today at 6:24 AM by 

ambulance.  EMS flow sheet not available in chart.  As per electronic records, 

last known well was 3 or 3:30 AM this morning.  Patient was sleeping on the 

couch.  At 6 AM the friend noticed that she was not responsive.  No CPR at the 

scene.  When EMS arrived, patient was pulseless.  Chest compressions was 

immediately started and performed for 15 minutes at the scene, before return of 

the spondylitic dysregulation.  Patient was brought to the ER.  Patient was 

intubated and placed on mechanical ventilation.  Patient is having some seizure 

type activity with tremoring, and low amplitude arrhythmic, asynchronous jerking

of the body.  She is currently not on any sedation.





I spoke to patient's sister on the phone, who mentions that patient has been 

homeless.  She has a son and her daughter, but has not seen her son for the last

3-4 years and her daughter since 2001.  Patient also has history of bipolar 

disorder, also has some kidney disorder, in which there is "scar tissue around 

the kidneys".  She has smoked 1 pack per day since she was age 14.  Patient's 

sister mentioned that she was "big on drinking, also did Meth and THC".  Patient

has no history of seizures.





Blood test shows WBC 20.5, hemoglobin 12.4, platelets 107.  INR 1.7, PTT 46.5.  

Patient's ABG shows pH of <6.8, pCO2 59, oxygen saturation 400, sodium 140 

potassium 6.3, BUN 23 creatinine 2.61, AST 3413, ALT 2679, ammonia 447 UA 

negative.  Urine drug screen positive for amphetamines, methamphetamine, cocaine

and marijuana.  Blood alcohol level 11, which is borderline.  Influenza, RSV and

corona virus PCR negative.  Patient's lactate was 18.0.  Repeat ABG with pH 7.07

pCO2 42.  Troponin has gone up to 1.090.





CT brain showed diffuse loss of gray-white matter differentiation and poor 

visualization of sulci.  Findings can be associated with diffuse axonal injury. 

I personally reviewed CT head, agree with the findings.  EKG shows sinus 

bradycardia CT of the abdomen and pelvis showed pulmonary vascular congestion 

compatible with cardiac arrest.  Bibasilar airspace opacities concerning for 

aspiration.  Distended gastric lumen with air ingested contents.  Left c

ompletely displaced anterior rib 4 fracture, likely due to chest compressions.





Review of Systems


ROS unobtainable: due to endotracheal tube, due to mental status





Past Medical History


Past Medical History: No Reported History


Additional Past Medical History / Comment(s): History of depression, history of 

cannabis abuse


History of Any Multi-Drug Resistant Organisms: None Reported


Past Surgical History: Tubal Ligation


Additional Past Surgical History / Comment(s): LEFT LEG R/T TRAUMA, BLADDER 

SURGERY


Past Anesthesia/Blood Transfusion Reactions: No Reported Reaction


Past Psychological History: Bipolar, Depression, PTSD


Smoking Status: Current every day smoker


Past Alcohol Use History: Daily


Past Drug Use History: Marijuana





- Past Family History


  ** Mother


Family Medical History: COPD





Medications and Allergies


                                Home Medications











 Medication  Instructions  Recorded  Confirmed  Type


 


No Known Home Medications  06/03/22 04/21/23 History








                                    Allergies











Allergy/AdvReac Type Severity Reaction Status Date / Time


 


No Known Allergies Allergy   Verified 04/21/23 07:08














Physical Examination





- Vital Signs


Vital Signs: 


                                   Vital Signs











  Temp Pulse Resp BP BP Pulse Ox FiO2


 


 04/21/23 19:48   93  16  112/84   97 


 


 04/21/23 19:28        70


 


 04/21/23 19:20        80


 


 04/21/23 19:15   92  16  109/82   98 


 


 04/21/23 18:00  99.7 F H  86  16  115/67   97 


 


 04/21/23 17:00  99.3 F  86  16  113/43   93 L 


 


 04/21/23 16:31     98/64   


 


 04/21/23 16:01        80


 


 04/21/23 16:00   87  16  92/45   99 


 


 04/21/23 15:00  98.2 F  80  16  106/58   100 


 


 04/21/23 14:30  36.6 F L  64  16  111/45   97 


 


 04/21/23 14:00  97.7 F  84  16  81/41   93 L 


 


 04/21/23 12:30  96.1 F L  84  16  94/57   93 L 


 


 04/21/23 12:13        80


 


 04/21/23 12:12        80


 


 04/21/23 12:00  94.5 F L  85  30 H  101/45   89 L 


 


 04/21/23 11:45  94.3 F L  77  16  108/54   88 L 


 


 04/21/23 11:26        50


 


 04/21/23 11:00  90.3 F L  64  20  122/68   100 


 


 04/21/23 10:00  86.5 F L  53 L  16  116/67   100 


 


 04/21/23 09:30  85.1 F L  46 L  16  125/77   100 


 


 04/21/23 09:00  83.8 F L  57 L  16  144/85   100 


 


 04/21/23 08:45     150/92   


 


 04/21/23 08:35  83.5 F L  55 L  16  142/89   100 


 


 04/21/23 08:33        100


 


 04/21/23 08:09  83.5 F L  56 L  16  124/79   100 


 


 04/21/23 08:00  82.9 F L  51 L  16  118/71   100 


 


 04/21/23 07:50  83.1 F L  52 L  16  104/64   


 


 04/21/23 07:42  83.1 F L  52 L  16  84/49   100 


 


 04/21/23 07:35   53 L  18  85/51   100 


 


 04/21/23 07:29     73/43   


 


 04/21/23 07:20   52 L  16  70/43   100 


 


 04/21/23 07:19   52 L  18  67/41   100 


 


 04/21/23 07:08      52/32  


 


 04/21/23 06:50      59/35  


 


 04/21/23 06:45      60/27  


 


 04/21/23 06:40      42/29  


 


 04/21/23 06:28        100








                                Intake and Output











 04/21/23 04/21/23 04/21/23





 06:59 14:59 22:59


 


Intake Total 3.499 10.291 


 


Balance 3.499 10.291 


 


Intake:   


 


  Intake, IV Titration 3.499 10.291 





  Amount   


 


    Norepinephrine 32 mg In  10.291 





    Sodium Chloride 0.9% 218   





    ml @ 0.03 MCG/KG/MIN 0.   





    861 mls/hr IV .Q24H ONE   





    Rx#:[id number]   


 


    Norepinephrine 4 mg In 3.499  





    Sodium Chloride 0.9% 250   





    ml @ 0.03 MCG/KG/MIN 6.   





    999 mls/hr IV .Q24H ONE   





    Rx#:[id number]   


 


Other:   


 


  Weight 61.235 kg  














Patient is a middle aged  female, who is comatose, with GCS of 3.  

Patient is intubated on mechanical ventilator.  Patient  on pressors with 

Levophed.


Patient is comatose, having some clonic or myoclonic twitching of her 

extremities.  Patient is comatose, with GCS of 3.  Patient's pupils are about 3 

mm,  very minimally, questionably reactive to light.   Oculocephalics are 

absent.  No corneal reflexes.  Patient is breathing slightly over the 

ventilator.   No gag reflex





On muscle strength testing, the tone is decreased on the extremities.  Patient 

not responding to any painful stimuli.





Deep tendon reflexes are absent in the upper limbs, 1 at the knees, 2 ankles and

plantars are flat.





Sensory to touch cannot be assessed, and patient is not responding to painful 

stimuli.  





Cerebellar functions cannot be tested.





On general examination, there is no carotid bruit or murmur, S1-S2 audible.   

Chest is clear on consultation.  Abdomen is soft nontender.  No organomegaly, 

bowel sounds present.   Peripheral pulses are present.  No edema.  Patient has 

odor of postmortem.





Results





- Laboratory Findings


CBC and BMP: 


                                 04/21/23 20:50





                                 04/21/23 20:50


Abnormal Lab Findings: 


                                  Abnormal Labs











  04/21/23 04/21/23 04/21/23





  06:45 06:45 06:45


 


WBC  20.4 H  


 


MCV  107.7 H D  


 


MCHC  28.4 L  


 


Neutrophils #  15.4 H  


 


Monocytes #  1.4 H  


 


PT   16.8 H 


 


INR   1.7 H 


 


APTT   46.5 H 


 


ABG pH   


 


ABG pCO2   


 


ABG pO2   


 


ABG HCO3   


 


ABG Total CO2   


 


ABG O2 Saturation   


 


Potassium    6.3 H*


 


Carbon Dioxide    9 L*


 


BUN    23 H


 


Creatinine    2.61 H


 


Glucose    187 H


 


POC Glucose (mg/dL)   


 


Plasma Lactic Acid Brent   


 


AST    3413 H


 


ALT    2679 H


 


Ammonia   


 


Troponin I   


 


Total Protein    5.7 L


 


Albumin    3.3 L


 


Urine Appearance   


 


Urine Protein   


 


Urine Glucose (UA)   


 


Urine Blood   


 


Urine Bacteria   


 


Hyaline Casts   


 


Urine Mucus   


 


Ur Amphetamines Screen   


 


U Methamphetamines Scrn   


 


Urine Cocaine Screen   


 


U Marijuana (THC) Screen   














  04/21/23 04/21/23 04/21/23





  06:45 06:45 06:50


 


WBC   


 


MCV   


 


MCHC   


 


Neutrophils #   


 


Monocytes #   


 


PT   


 


INR   


 


APTT   


 


ABG pH   


 


ABG pCO2   


 


ABG pO2   


 


ABG HCO3   


 


ABG Total CO2   


 


ABG O2 Saturation   


 


Potassium   


 


Carbon Dioxide   


 


BUN   


 


Creatinine   


 


Glucose   


 


POC Glucose (mg/dL)    193 H


 


Plasma Lactic Acid Brent   20.4 H* 


 


AST   


 


ALT   


 


Ammonia   447 H 


 


Troponin I  0.175 H*  


 


Total Protein   


 


Albumin   


 


Urine Appearance   


 


Urine Protein   


 


Urine Glucose (UA)   


 


Urine Blood   


 


Urine Bacteria   


 


Hyaline Casts   


 


Urine Mucus   


 


Ur Amphetamines Screen   


 


U Methamphetamines Scrn   


 


Urine Cocaine Screen   


 


U Marijuana (THC) Screen   














  04/21/23 04/21/23 04/21/23





  07:00 07:00 07:20


 


WBC   


 


MCV   


 


MCHC   


 


Neutrophils #   


 


Monocytes #   


 


PT   


 


INR   


 


APTT   


 


ABG pH    <6.80 L*


 


ABG pCO2    59 H


 


ABG pO2    >400 H


 


ABG HCO3    8 L*


 


ABG Total CO2    10 L


 


ABG O2 Saturation    99.9 H


 


Potassium   


 


Carbon Dioxide   


 


BUN   


 


Creatinine   


 


Glucose   


 


POC Glucose (mg/dL)   


 


Plasma Lactic Acid Brent   


 


AST   


 


ALT   


 


Ammonia   


 


Troponin I   


 


Total Protein   


 


Albumin   


 


Urine Appearance  Cloudy H  


 


Urine Protein  2+ H  


 


Urine Glucose (UA)  Trace H  


 


Urine Blood  Trace H  


 


Urine Bacteria  Rare H  


 


Hyaline Casts  5 H  


 


Urine Mucus  Rare H  


 


Ur Amphetamines Screen   Detected H 


 


U Methamphetamines Scrn   Detected H 


 


Urine Cocaine Screen   Detected H 


 


U Marijuana (THC) Screen   Detected H 














  04/21/23 04/21/23 04/21/23





  10:26 11:02 13:45


 


WBC   


 


MCV   


 


MCHC   


 


Neutrophils #   


 


Monocytes #   


 


PT   


 


INR   


 


APTT   


 


ABG pH    7.07 L*


 


ABG pCO2   


 


ABG pO2   


 


ABG HCO3    12 L


 


ABG Total CO2    13 L


 


ABG O2 Saturation   


 


Potassium   


 


Carbon Dioxide   


 


BUN   


 


Creatinine   


 


Glucose   


 


POC Glucose (mg/dL)   


 


Plasma Lactic Acid Brent   18.0 H* 


 


AST   


 


ALT   


 


Ammonia   


 


Troponin I  0.259 H*  


 


Total Protein   


 


Albumin   


 


Urine Appearance   


 


Urine Protein   


 


Urine Glucose (UA)   


 


Urine Blood   


 


Urine Bacteria   


 


Hyaline Casts   


 


Urine Mucus   


 


Ur Amphetamines Screen   


 


U Methamphetamines Scrn   


 


Urine Cocaine Screen   


 


U Marijuana (THC) Screen   














  04/21/23 04/21/23 04/21/23





  14:46 18:43 18:43


 


WBC   


 


MCV   


 


MCHC   


 


Neutrophils #   


 


Monocytes #   


 


PT   


 


INR   


 


APTT   


 


ABG pH   


 


ABG pCO2   


 


ABG pO2   


 


ABG HCO3   


 


ABG Total CO2   


 


ABG O2 Saturation   


 


Potassium   


 


Carbon Dioxide   


 


BUN   


 


Creatinine   


 


Glucose   


 


POC Glucose (mg/dL)   


 


Plasma Lactic Acid Brent  12.5 H*   9.6 H*


 


AST   


 


ALT   


 


Ammonia   


 


Troponin I   1.090 H* 


 


Total Protein   


 


Albumin   


 


Urine Appearance   


 


Urine Protein   


 


Urine Glucose (UA)   


 


Urine Blood   


 


Urine Bacteria   


 


Hyaline Casts   


 


Urine Mucus   


 


Ur Amphetamines Screen   


 


U Methamphetamines Scrn   


 


Urine Cocaine Screen   


 


U Marijuana (THC) Screen   














Assessment and Plan


Assessment: 





* Unwitnessed cardiac arrest, with CPR performed for 15 minutes prior to ROSC.


* Severe anoxic encephalopathy, with comatose state, GCS of 3.


* Myoclonic/clonic twitching of the extremities, rule out status epilepticus.


* Abnormal CT head, with evidence of diffuse cerebral edema, with loss of gray-

  white junction.


* Shock, hypotension requiring pressors.


* Hypothermia on arrival 83.1, which now resolved.  Current temperature 99.7.


* Severe lactic acidosis due to above


* Renal failure


* Hepatic dysfunction, likely due to shock.


* Aspiration pneumonia


* Left fourth rib fracture, likely secondary to CPR


* Polysubstance abuse, with urine positive for amphetamines, methamphetamine, 

  cocaine and marijuana


* History of alcoholism


* Tobacco use


* History of chronic renal disease


* Bipolar depression





Plan: 





* Patient has severe anoxic encephalopathy, with comatose state, and evidence of

  generalized cerebral edema on CT head.  


* Patient is exhibiting seizure-like activity, which could be myoclonic jerks 

  related to anoxic encephalopathy.


* Patient will be empirically started on Dilantin.  She will receive loading 

  dose of Dilantin 1200 mg IV.  Check Dilantin level in the morning. 


* Stat EEG in the morning.


* I discussed with patient's sister on the phone, informed her about very poor 

  prognosis based upon her clinical examination, CT head findings and 

  comorbidities due to cardiac arrest.


* Other medical management as per critical care and IM.


* Neurology will follow.  Thank you for the consult.

## 2023-04-26 NOTE — CDI
Documentation Clarification Form



Date: 4/26/23

From: Marcella Joya

MRN: [id number]

Admit Date: 4/21/2023 10:17:00 AM

Patient Name: Rafiq Coffey

Visit Number: [id number]

Discharge Date:  4/22/2023 6:19:00 AM





ATTENTION: The Clinical Documentation Specialists (CDI) and [facility] Coding Staff 
appreciate your assistance in clarifying documentation. Please respond to the 
clarification below the line at the bottom and electronically sign. The CDI & 
[facility] Coding staff will review the response and follow-up if needed. Please note: 
Queries are made part of the Legal Health Record. If you have any questions, 
please contact the author of this message via ITS.



Dr. Reggie Vann,



Your patient has acute respiratory failure documented in the ED Note.   Based on
this information and the findings below, is there an additional diagnosis that 
is clinically appropriate for this patient?



History/Risk Factors:  cardiac arrest, aspiration pneumonia, shock liver, 
polysubstance abuse, Type II MI, DANUTA, shock

Tobacco use:  yes

Home oxygen: no



Clinical Indicators:  When EMS arrived, patient was pulseless. CPR was 
immediately started. Compressions for approximately 15 minutes at the scene. 
Received a total of 1 of epinephrine, 1 amp of D50, and Narcan. Rosc was 
achieved. She was intubated with a 6.0 ET tube.  Hypotensive and tachycardic.

Vital signs: T83.1, P 87, R30, BP 67/41

Pulse oximetry: intubated by EMS

Lung/Breathing assessment: on mechanical ventilator

ABG/CBG:  pH <6.80      pO2  >400             pCO2  59      Lactate  20.4



Treatment: Intubated by EMS and on mechanical ventilation



Is there an additional diagnosis that is clinically appropriate for this 
patient?

[  ] Acute Hypoxic Respiratory Failure (pO2 <60 mm Hg or SpO2 <91% on room air)

[  ] Acute Hypercapnic Respiratory Failure (pCO2 >50 and pH <7.35)

[  ] Other Diagnosis, please specify _____

[  ] Unable to determine

___________________________________________________________________________

WILLYD

## 2023-04-26 NOTE — CDI
Documentation Clarification Form



Date: 4/26/23

From: Marcella Joya 

MRN: [id number]

Admit Date: 4/21/2023 10:17:00 AM

Patient Name: Rafiq Cofefy

Visit Number: [id number]

Discharge Date:  4/22/2023 6:19:00 AM



ATTENTION: The Clinical Documentation Specialists (CDI) and [facility] Coding Staff 
appreciate your assistance in clarifying documentation. Please respond to the 
clarification below the line at the bottom and electronically sign. The CDI & 
[facility] Coding staff will review the response and follow-up if needed. Please note: 
Queries are made part of the Legal Health Record. If you have any questions, 
please contact the author of this message via ITS.



Dr. Reggie Vann,



The ED Note states that the patient has sepsis and end organ dysfunction.



Based on this information and the findings below, is there an additional 
diagnosis that is clinically appropriate for this patient?



History/Risk Factors:  cardiac arrest, aspiration pneumonia, shock liver, 
polysubstance abuse, Type II MI, DANUTA, shock



Clinical Indicators: ED Note-sepsis and end organ dysfunction.

WBC:  20.4    

Neutrophils: 15.4 

Lactic acid: 20.4

Blood cultures: Negative

Vitals signs:  T83.1, P 87, R30, BP 67/41



Treatment:  IV fluids, IV Levophed, IV Maxipime, IV Vancomycin, IV  Zosyn



Is there an additional diagnosis that is clinically appropriate for this 
patient?



[  ] Sepsis ruled out

[  ] Severe Sepsis with organ failure and septic shock

[  ] Other, please specify  _____________

[  ] Unable to determine



SIRS Criteria: 2 or more of the following may indicate SIRS   

Temperature         < 96.8F (36C) or > 101.0F (38.3C)

Heart Rate         > 90 bpm

Respiratory Rate      > 20 breaths/min or PaCO2 < 32 mmHg

White Blood Cell Count   > 12,000 or < 4,000 cells/mm3 or > 10% bands

___________________________________________________________________________

MTDD

## 2023-04-28 NOTE — PN
PROGRESS NOTE



Sepsis ruled out with organ failure and septic shock.





MMODL / IJN: [id number] / Job#: [id number]

## 2023-04-28 NOTE — PN
PROGRESS NOTE



Acute hypoxemic respiratory failure and hypercapnic respiratory failure.





MMODL / IJN: [id number] / Job#: [id number]

## 2023-05-01 NOTE — CDI
Documentation Clarification Form



Date: 5/1/23

From: Marcella Joya

MRN: [id number]

Admit Date: 4/21/2023 10:17:00 AM

Patient Name: Rafiq Coffey

Visit Number: [id number]

Discharge Date:  4/22/2023 6:19:00 AM





ATTENTION: The Clinical Documentation Specialists (CDI) and [facility] Coding Staff 
appreciate your assistance in clarifying documentation. Please respond to the 
clarification below the line at the bottom and electronically sign. The CDI & 
[facility] Coding staff will review the response and follow-up if needed. Please note: 
Queries are made part of the Legal Health Record. If you have any questions, 
please contact the author of this message via ITS.



Dr. Reggie Vann,



Unspecified CKD is documented history of chronic renal failure.  Additional 
clarification regarding the stage of CKD is requested.



History/Risk Factors: cardiac arrest,aspiration pneumonia,shock liver, 
polysubstance abuse,Type II MI,DANUTA,shock

Patients Historical BUN/CR/GFR: none

Clinical Indicators: DOS 4/21

Current BUN: 23, 48

Current CR: 2.61. 3.09

Current GFR: 21, 17

Treatment: IV fluids, repeat labs, 

      

Please clarify the stage of the CKD, if known:



[  ] CKD Stage 1 (GFR > 90)

[  ] CKD Stage 2 (GFR 60-89)

[  ] CKD Stage 3 (GFR 30-59)

[  ] CKD Stage 3a (GFR 45-59)

[  ] CKD Stage 3b (GFR 30-44)

[  ] CKD Stage 4 (GFR 15-29)

[  ] CKD Stage 5 (GFR <15)

[  ] ESRD

[  ] Other, please specify ___________

[  ] Unable to determine

___________________________________________________________________________

MTDD

## 2023-05-01 NOTE — CDI
Documentation Clarification Form



Date: 5/1/23

From: Marcella Joya

MRN: [id number]

Admit Date: 4/21/2023 10:17:00 AM

Patient Name: Rafiq Coffey

Visit Number: [id number]

Discharge Date:  4/22/2023 6:19:00 AM





ATTENTION: The Clinical Documentation Specialists (CDI) and [facility] Coding Staff 
appreciate your assistance in clarifying documentation. Please respond to the 
clarification below the line at the bottom and electronically sign. The CDI & 
[facility] Coding staff will review the response and follow-up if needed. Please note: 
Queries are made part of the Legal Health Record. If you have any questions, 
please contact the author of this message via ITS.



Dr. Reggie Vann,



The patients principal diagnosis  the diagnosis that was chiefly responsible 
for the admission - has not been clearly identified and clarification is 
requested. 



The patient presented with the following: per Dr Collins consult - Cardiac 
arrestwas multi-substance useprobableoverdose.

History/Risk factors: cardiac arrest,aspiration pneumonia,shock liver, 
polysubstance abuse,Type II MI,DANUTA,shock



Clinical Indicators:  She has history of polysubstance abuse. Found in cardiac 
arrest, EMS, CPR was started, ! of Epinephrine, 1 amp of D50 and Narcan. Patient
intubated. Hypotensive and tachycardic. 

Lab findings: Toxicology: Detected Amphetamines, Methamphetamines, Cocaine and 
Marijuana

Radiology findings:

Vital Signs: Last EMS VS were: BP 20/56, MAP 64, P 64, R 14, SPO2 94, CO2 41.0



Treatment: Drug screen, IV fluids, IV Levophed, ventilator, IV antibiotics, Sod 
Bicarb, IV Narcab

Consults:



In your professional opinion, can you please clarify which diagnosis, after 
study, was the reason chiefly responsible for the admission?



[  ] Overdose of Amphetamines

[  ] Overdose of Methamphetamines

[  ] Overdose of Cocaine

[  ] Overdose of Marijuana

[  ] All of the above

[  ] Other, please specify ______

[  ] Unable to determine

___________________________________________________________________________

MTDD

## 2023-05-03 NOTE — PN
PROGRESS NOTE



ADDENDUM:

Overdose of possible amphetamines, methamphetamines, cocaine, marijuana.





MMODL / IJN: [id number] / Job#: [id number]

## 2023-05-04 NOTE — CA
Transthoracic Echo Report 

 Name: Rafiq Coffey 

 MRN:    [id number] 

 Age:    47     Gender:     F 

 

 :    1975 

 Exam Date:     2023 17:06 

 Exam Location: [facility] 

 Ht (in):     63     Wt (lb):     135 

 Ordering Physician:        Maylin Holley MD 

 Attending/Referring Phys: 

 Technician         Zahida Douglass RDCS 

 Procedure CPT: 

 Indications:       Cardiac Arrest 

 

 Cardiac Hx: 

 Technical Quality:      Good 

 Contrast 1:                                Total Dose (mL): 

 Contrast 2:                                Total Dose (mL): 

 

 MEASUREMENTS  (Male / Female) Normal Values 

 2D ECHO 

 LV Diastolic Diameter PLAX        4.4 cm                4.2 - 5.9 / 3.9 - 5.3 cm 

 LV Systolic Diameter PLAX         2.8 cm                 

 IVS Diastolic Thickness           0.7 cm                0.6 - 1.0 / 0.6 - 0.9 cm 

 LVPW Diastolic Thickness          0.8 cm                0.6 - 1.0 / 0.6 - 0.9 cm 

 LV Relative Wall Thickness        0.3                    

 RV Internal Dim ED PLAX           2.4 cm                 

 LA Systolic Diameter LX           1.9 cm                3.0 - 4.0 / 2.7 - 3.8 cm 

 LV Diastolic Volume MOD BP        50.9 cm???              67 - 155 / 56 - 104 cm??? 

 LV Systolic Volume MOD BP         27.2 cm???              22 - 58 / 19 - 49 cm??? 

 LV Ejection Fraction MOD BP       46.6 %                >= 55  % 

 LV Diastolic Volume MOD 4C        52.5 cm???               

 LV Systolic Volume MOD 4C         29.2 cm???               

 LV Ejection Fraction MOD 4C       44.3 %                 

 LV Diastolic Length 4C            6.3 cm                 

 LV Systolic Length 4C             6.2 cm                 

 LV Diastolic Volume MOD 2C        46.6 cm???               

 LV Systolic Volume MOD 2C         25.5 cm???               

 LV Ejection Fraction MOD 2C       45.2 %                 

 LV Diastolic Length 2C            5.8 cm                 

 LV Systolic Length 2C             6.2 cm                 

 LA Volume                         18.0 cm???              18 - 58 / 22 - 52 cm??? 

 

 M-MODE 

 Aortic Root Diameter MM           2.7 cm                 

 MV E Point Septal Separation      0.8 cm                 

 AV Cusp Separation MM             1.9 cm                 

 

 DOPPLER 

 AV Peak Velocity                  148.7 cm/s             

 AV Peak Gradient                  8.8 mmHg               

 MV Area PHT                       4.2 cm???                

 Mitral E Point Velocity           88.6 cm/s              

 Mitral A Point Velocity           58.1 cm/s              

 Mitral E to A Ratio               1.5                    

 MV Deceleration Time              181.6 ms               

 MV E' Velocity                    10.6 cm/s              

 Mitral E to MV E' Ratio           8.3                    

 TR Peak Velocity                  233.8 cm/s             

 TR Peak Gradient                  21.9 mmHg              

 Right Ventricular Systolic Press  26.1 mmHg              

 

 

 FINDINGS 

 Left Ventricle 

 Left ventricular ejection fraction is estimated at 40-45 %. Left ventricular  

 cavity size normal. Left ventricular wall thickness normal.  Moderately  

 decreased left ventricular ejection fraction. 

 

 Right Ventricle 

 Normal right ventricular size and function. Right ventricular systolic pressure  

 within normal limits. 

 

 Right Atrium 

 Normal right atrial size. 

 

 Left Atrium 

 Normal left atrial size. 

 

 Mitral Valve 

 Structurally normal mitral valve.  Mild mitral regurgitation. 

 

 Aortic Valve 

 Trileaflet aortic valve. No aortic valve stenosis or regurgitation. 

 

 Tricuspid Valve 

 Structurally normal tricuspid valve. Mild to moderate tricuspid regurgitation. 

 

 Pulmonic Valve 

 Pulmonic valve not well visualized. . No pulmonic regurgitation. 

 

 Pericardium 

 Normal pericardium. No pericardial effusion. 

 

 Aorta 

 Normal size aortic root and proximal ascending aorta. 

 

 CONCLUSIONS 

 1.  Moderate global hypokinesis of the left ventricle 

 2.  Mild mitral with mild to moderate tricuspid regurgitation and normal right  

 ventricle systolic pressure 

 Previewed by:  

 Dr. Vivian Louis MD 

 (Electronically Signed) 

 Final Date:      04 May 2023 07:12